# Patient Record
Sex: FEMALE | URBAN - METROPOLITAN AREA
[De-identification: names, ages, dates, MRNs, and addresses within clinical notes are randomized per-mention and may not be internally consistent; named-entity substitution may affect disease eponyms.]

---
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
## 2023-01-13 ENCOUNTER — ATHLETIC TRAINING (OUTPATIENT)
Dept: SPORTS MEDICINE | Facility: OTHER | Age: 18
End: 2023-01-13

## 2023-01-13 DIAGNOSIS — M54.50 CHRONIC BILATERAL LOW BACK PAIN WITHOUT SCIATICA: Primary | ICD-10-CM

## 2023-01-13 DIAGNOSIS — G89.29 CHRONIC BILATERAL LOW BACK PAIN WITHOUT SCIATICA: Primary | ICD-10-CM

## 2023-01-13 NOTE — PROGRESS NOTES
Athletic Training Progress Note    Name: Florinda Ocampo  Age: 21 y o  Assessment/Plan:     Visit Diagnosis: Neck and back tightness    Treatment Plan: Cont to work on pain management, strengthening, and flexibility  [x]  Follow-up PRN  []  Follow-up prior to next practice/game for re-evaluation  []  Daily treatment/rehab  Progress note expected weekly  Subjective: Ath reports to the clinic to work on her neck and low back  She states the treatment from her last appointment has helped  She c/o of "knots" in her low back and neck tightness  Objective:   See treatment log below  Treatment Log:     Date: 1/13 12/11 12/7/22   Playing Status: As tolerated As tolerated  As tolerated         Exercise/Treatment      MHP  10min Completed    Sub-occipital release  5min completed   Self stretches  3x45s 3x45s   Retractions x2 3x10 5s hold 3x10 with 5s hold   Cupping  5min    Joint mobs  - completed   VAC  Full body     Momma had a baby and it's head popped off x2        She will cont to make appointments prn

## 2023-01-17 ENCOUNTER — ATHLETIC TRAINING (OUTPATIENT)
Dept: SPORTS MEDICINE | Facility: OTHER | Age: 18
End: 2023-01-17

## 2023-01-17 DIAGNOSIS — S86.312A STRAIN OF LEFT PERONEAL MUSCLE OR TENDON: ICD-10-CM

## 2023-01-17 DIAGNOSIS — M25.572 ACUTE LEFT ANKLE PAIN: Primary | ICD-10-CM

## 2023-01-17 NOTE — PROGRESS NOTES
Athletic Training Foot/Ankle Evaluation    Name: Farzana Dickson  Age: 21 y o    School District: [employer]  Sport: [employer] and [employer]  Date of Assessment: 1/17/2023    Assessment/Plan:     Visit Diagnosis: No primary diagnosis found  Treatment Plan: Cont with strengthening  []  Follow-up PRN  []  Follow-up prior to next practice/game for re-evaluation  [x]  Daily treatment/rehab  Progress note expected weekly  Referral:     [x]  Not needed at this time  []  Referred to:     [x]  Coaching staff notified  []  Parent/Guardian Notified    Subjective:    Date of Injury: unknown    Injury occurred during:     []  Practice  []  Competition  [x]  Other: unknown    Mechanism: unknown    Previous History: Has past history of tendinitis of B/L ankles    Reported Symptoms:     [] Felt pop [] Weakness   [] Cracking or snapping [] Grinding   [] Twisted [x] Sharp pain   [x] Pain with rest [] Burning   [x] Pain with activity [x] Dull or achy   [x] Pain with stairs [] Felt give way   [] Numbness or tingling [] Loss of motion     Objective:    Observation:     [x]  No observable findings compared bilaterally    [] Swelling [] Callous or blister   [] Ecchymosis [] Nail abnormality   [] Redness [] Ingrown nail   [] Deformity [] Bunion formation   [] Abnormal gait [] Pes planus   [] Pitting edema [] Pes cavus   [] Open wound [] Atrophy     Palpation: TTP along the posterior tibialis tendon and achilles tendon      Active Range of Motion:      Full  ROM Limited  ROM Pain  with  ROM No  Motion   Dorsiflexion [x] [] [] []   Plantarflexion [x] [] [] []   Inversion [x] [] [] []   Eversion [x] [] [] []   Great Toe Flexion [] [] [] []   Great Toe Extension [] [] [] []   Toe Flexion [] [] [] []   Toe Extension [] [] [] []     ERP with PF    Manual Muscle Tests:     Not performed []             5 4+ 4 4- 3 or  Under   Dorsiflexion [x] [] [] [] []   Plantarflexion [x] [] [] [] []   Inversion [x] [] [] [] []   Eversion [x] [] [] [] []   Great Toe Flexion [] [] [] [] []   Great Toe Extension [] [] [] [] []   Toe Flexion [] [] [] [] []   Toe Extension [] [] [] [] []     Special Tests:      (+)  Laxity (+)  Pain (-)  WNL Not  Tested   Bump [] [] [x] []   Squeeze [] [] [] []   Percussion [] [] [] []   Tuning Fork [] [] [] []   Anterior Drawer [] [] [x] []   Posterior Drawer [] [] [] []   Talar Tilt - Inversion [] [] [x] []   Talar Tilt - Eversion [] [] [x] []   Kleiger [] [] [] []   Toe Compression [] [] [] []   Toe Distraction [] [] [] []   MTP Valgus [] [] [] []   MTP Varus [] [] [] []   Intermetatarsal Glide [] [] [] []   Tarsometatarsal Glide [] [] [] []   Tinel's [] [] [] []   Impingement Sign [] [] [] []   Oseguera's (Achilles) [] [] [] []   Mary's Sign (DVT) [] [] [] []   Interdigital Neuroma [] [] [] []   Navicular Drop [] [] [] []     Treatment Log:     Date: 11/29/22   Playing Status: As tolerated       Exercise/Treatment    4-way ankle blue band 3x10   Side steps with yellow band 5x   Foam  3x B/L   SLB on blue pad 2x45s R and 2x30s L                                  She will cont to make appointments  1/17/23  Pt did not continue to complete appointments  She felt that she injured her outside of her ankle during jumping    LB ATC

## 2023-01-19 ENCOUNTER — ATHLETIC TRAINING (OUTPATIENT)
Dept: SPORTS MEDICINE | Facility: OTHER | Age: 18
End: 2023-01-19

## 2023-01-19 DIAGNOSIS — G89.29 CHRONIC BILATERAL LOW BACK PAIN WITHOUT SCIATICA: Primary | ICD-10-CM

## 2023-01-19 DIAGNOSIS — M54.50 CHRONIC BILATERAL LOW BACK PAIN WITHOUT SCIATICA: Primary | ICD-10-CM

## 2023-01-19 NOTE — PROGRESS NOTES
Athletic Training Progress Note    Name: Rafaela Bermudez  Age: 21 y o  Assessment/Plan:   Visit Diagnosis: Mid back pain     Treatment Plan:   []  Follow-up PRN  []  Follow-up prior to next practice/game for re-evaluation  [x]  Daily treatment/rehab  Progress note expected weekly  Subjective: Ath states her mid back is bothering her  She doesn't remember doing anything specific  Objective:   See treatment log below    Treatment Log:  Date: 11/9/22   Playing Status: As tolerated       Exercise/Treatment    MHP 10min   Knee windshield wipers 3x10   Pelvic tilts 2x10 (focus on breathing)         1/19/23  Resolved    LB ATC

## 2023-01-19 NOTE — PROGRESS NOTES
1/19/23  Pt asked if she should compete this weekend and discussed no to compete  Complete Rolling, back unloading, and thoracic mob  LB ATC    1/17/23  A: Left ankle fibularis tendinopathy  S: Pt states that she felt sore in the area

## 2023-01-22 ENCOUNTER — ATHLETIC TRAINING (OUTPATIENT)
Dept: SPORTS MEDICINE | Facility: OTHER | Age: 18
End: 2023-01-22

## 2023-01-22 DIAGNOSIS — S86.312A STRAIN OF LEFT PERONEAL MUSCLE OR TENDON: Primary | ICD-10-CM

## 2023-01-23 NOTE — PROGRESS NOTES
Athletic Training Foot/Ankle Evaluation     Name: Yamileth Cade  Age: 21 y o    School District: [facility]  Sport: [employer] and [employer]  Date of Assessment: 1/17/2023     Assessment/Plan:      Visit Diagnosis: No primary diagnosis found      Treatment Plan: Cont with strengthening      []? Follow-up PRN  []?  Follow-up prior to next practice/game for re-evaluation  [x]? Daily treatment/rehab  Progress note expected weekly       Referral:      [x]? Not needed at this time  []? Referred to:      [x]? Coaching staff notified  []? Parent/Guardian Notified     Subjective:     Date of Injury: unknown     Injury occurred during:      []? Practice  []? Competition  [x]? Other: unknown     Mechanism: unknown     Previous History: Has past history of tendinitis of B/L ankles     Reported Symptoms:      []?  Felt pop []?  Weakness   []?  Cracking or snapping []?  Grinding   []?  Twisted [x]?   Sharp pain   [x]?  Pain with rest []?  Burning   [x]?  Pain with activity [x]?  Dull or achy   [x]?  Pain with stairs []?  [place] give way   []?  Numbness or tingling []?  Loss of motion      Objective:     Observation:      [x]?   No observable findings compared bilaterally     []?  Swelling []?  Callous or blister   []?  Ecchymosis []?  Nail abnormality   []?  Redness []?  Ingrown nail   []?  Deformity []?  Bunion formation   []?  Abnormal gait []?  Pes planus   []?  Pitting edema []?  Pes cavus   []?  Open wound []?  Atrophy      Palpation: TTP along the posterior tibialis tendon and achilles tendon      Active Range of Motion:        Full  ROM Limited  ROM Pain  with  ROM No  Motion   Dorsiflexion [x]?  []?  []?  []?    Plantarflexion [x]?  []?  []?  []?    Inversion [x]?  []?  []?  []?    Eversion [x]?  []?  []?  []?    Great Toe Flexion []?  []?  []?  []?    Great Toe Extension []?  []?  []?  []?    Toe Flexion []?  []?  []?  []?    Toe Extension []?  []?  []?  []?       ERP with PF     Manual Muscle Tests:     Not performed []?                      5 4+ 4 4- 3 or  Under   Dorsiflexion [x]?  []?  []?  []?  []?    Plantarflexion [x]?  []?  []?  []?  []?    Inversion [x]?  []?  []?  []?  []?    Eversion [x]?  []?  []?  []?  []?    Great Toe Flexion []?  []?  []?  []?  []?    Great Toe Extension []?  []?  []?  []?  []?    Toe Flexion []?  []?  []?  []?  []?    Toe Extension []?  []?  []?  []?  []?       Special Tests:        (+)  Laxity (+)  Pain (-)  WNL Not  Tested   Bump []?  []?  [x]?  []?    Squeeze []?  []?  []?  []?    Percussion []?  []?  []?  []?    Tuning Fork []?  []?  []?  []?    Anterior Drawer []?  []?  [x]?  []?    Posterior Drawer []?  []?  []?  []?    Talar Tilt - Inversion []?  []?  [x]?  []?    Talar Tilt - Eversion []?  []?  [x]?  []?    Kleiger []?  []?  []?  []?    Toe Compression []?  []?  []?  []?    Toe Distraction []?  []?  []?  []?    MTP Valgus []?  []?  []?  []?    MTP Varus []?  []?  []?  []?    Intermetatarsal Glide []?  []?  []?  []?    Tarsometatarsal Glide []?  []?  []?  []?    Tinel's []?  []?  []?  []?    Impingement Sign []?  []?  []?  []?    Oseguera's (Achilles) []?  []?  []?  []?    Mary's Sign (DVT) []?  []?  []?  []?    Interdigital Neuroma []?  []?  []?  []?    Navicular Drop []?  []?  []?  []?       Treatment Log:  Date: 1/23/23 11/29/22   Playing Status: As Tolerated As tolerated          Exercise/Treatment      4-way ankle blue band 3x10 3x10   Side steps with yellow band 5x 5x   Foam  3xB/L 3x B/L   SLB on blue pad X 2x45s R and 2x30s L                                                        She will cont to make appointments      1/17/23  Pt did not continue to complete appointments  She felt that she injured her outside of her ankle during jumping    LB ATC

## 2023-01-24 ENCOUNTER — ATHLETIC TRAINING (OUTPATIENT)
Dept: SPORTS MEDICINE | Facility: OTHER | Age: 18
End: 2023-01-24

## 2023-01-24 DIAGNOSIS — S86.312A STRAIN OF LEFT PERONEAL MUSCLE OR TENDON: Primary | ICD-10-CM

## 2023-01-25 NOTE — PROGRESS NOTES
Athletic Training Foot/Ankle Evaluation     Name: Yamileth Cade  Age: 21 y o    School District: [facility]  Sport: [employer]  Date of Assessment: 1/17/2023     Assessment/Plan:      Visit Diagnosis: No primary diagnosis found      Treatment Plan: Cont with strengthening      []? Follow-up PRN  []?  Follow-up prior to next practice/game for re-evaluation  [x]? Daily treatment/rehab  Progress note expected weekly       Referral:      [x]? Not needed at this time  []? Referred to:      [x]? Coaching staff notified  []? Parent/Guardian Notified     Subjective:     Date of Injury: unknown     Injury occurred during:      []? Practice  []? Competition  [x]? Other: unknown     Mechanism: unknown     Previous History: Has past history of tendinitis of B/L ankles     Reported Symptoms:      []?  Felt pop []?  Weakness   []?  Cracking or snapping []?  Grinding   []?  Twisted [x]?   Sharp pain   [x]?  Pain with rest []?  Burning   [x]?  Pain with activity [x]?  Dull or achy   [x]?  Pain with stairs []?  [place] give way   []?  Numbness or tingling []?  Loss of motion      Objective:     Observation:      [x]?   No observable findings compared bilaterally     []?  Swelling []?  Callous or blister   []?  Ecchymosis []?  Nail abnormality   []?  Redness []?  Ingrown nail   []?  Deformity []?  Bunion formation   []?  Abnormal gait []?  Pes planus   []?  Pitting edema []?  Pes cavus   []?  Open wound []?  Atrophy      Palpation: TTP along the posterior tibialis tendon and achilles tendon      Active Range of Motion:        Full  ROM Limited  ROM Pain  with  ROM No  Motion   Dorsiflexion [x]?  []?  []?  []?    Plantarflexion [x]?  []?  []?  []?    Inversion [x]?  []?  []?  []?    Eversion [x]?  []?  []?  []?    Great Toe Flexion []?  []?  []?  []?    Great Toe Extension []?  []?  []?  []?    Toe Flexion []?  []?  []?  []?    Toe Extension []?  []?  []?  []?       ERP with PF     Manual Muscle Tests:     Not performed []?                      5 4+ 4 4- 3 or  Under   Dorsiflexion [x]?  []?  []?  []?  []?    Plantarflexion [x]?  []?  []?  []?  []?    Inversion [x]?  []?  []?  []?  []?    Eversion [x]?  []?  []?  []?  []?    Great Toe Flexion []?  []?  []?  []?  []?    Great Toe Extension []?  []?  []?  []?  []?    Toe Flexion []?  []?  []?  []?  []?    Toe Extension []?  []?  []?  []?  []?       Special Tests:        (+)  Laxity (+)  Pain (-)  WNL Not  Tested   Bump []?  []?  [x]?  []?    Squeeze []?  []?  []?  []?    Percussion []?  []?  []?  []?    Tuning Fork []?  []?  []?  []?    Anterior Drawer []?  []?  [x]?  []?    Posterior Drawer []?  []?  []?  []?    Talar Tilt - Inversion []?  []?  [x]?  []?    Talar Tilt - Eversion []?  []?  [x]?  []?    Kleiger []?  []?  []?  []?    Toe Compression []?  []?  []?  []?    Toe Distraction []?  []?  []?  []?    MTP Valgus []?  []?  []?  []?    MTP Varus []?  []?  []?  []?    Intermetatarsal Glide []?  []?  []?  []?    Tarsometatarsal Glide []?  []?  []?  []?    Tinel's []?  []?  []?  []?    Impingement Sign []?  []?  []?  []?    Oseguera's (Achilles) []?  []?  []?  []?    Mary's Sign (DVT) []?  []?  []?  []?    Interdigital Neuroma []?  []?  []?  []?    Navicular Drop []?  []?  []?  []?       Treatment Log:  Date: 1/24/23 1/23/23 11/29/22   Playing Status: As tolerated As Tolerated As tolerated           Exercise/Treatment       4-way ankle blue band  3x10 3x10   Side steps with yellow band  5x 5x   Foam   3xB/L 3x B/L   SLB on blue pad  X 2x45s R and 2x30s L    Stepup 10       Bounds 2x10       Tables x6                                          She will cont to make appointments    1/24/23  Pt was able to run yesterday and would like to jump today  LB ATC    1/17/23  Pt did not continue to complete appointments  She felt that she injured her outside of her ankle during jumping    LB ATC

## 2023-01-26 ENCOUNTER — ATHLETIC TRAINING (OUTPATIENT)
Dept: SPORTS MEDICINE | Facility: OTHER | Age: 18
End: 2023-01-26

## 2023-01-26 DIAGNOSIS — M25.572 ACUTE LEFT ANKLE PAIN: ICD-10-CM

## 2023-01-26 DIAGNOSIS — S86.312A STRAIN OF LEFT PERONEAL MUSCLE OR TENDON: Primary | ICD-10-CM

## 2023-01-26 NOTE — PROGRESS NOTES
Athletic Training Foot/Ankle Evaluation     Name: Reji Aranda  Age: 21 y o    School District: [facility] [employer] [facility]  Sport: [employer]  Date of Assessment: 1/17/2023     Assessment/Plan:      Visit Diagnosis: No primary diagnosis found      Treatment Plan: Cont with strengthening      []? Follow-up PRN  []?  Follow-up prior to next practice/game for re-evaluation  [x]? Daily treatment/rehab  Progress note expected weekly       Referral:      [x]? Not needed at this time  []? Referred to:      [x]? Coaching staff notified  []? Parent/Guardian Notified     Subjective:     Date of Injury: unknown     Injury occurred during:      []? Practice  []? Competition  [x]? Other: unknown     Mechanism: unknown     Previous History: Has past history of tendinitis of B/L ankles     Reported Symptoms:      []?  Felt pop []?  Weakness   []?  Cracking or snapping []?  Grinding   []?  Twisted [x]?   Sharp pain   [x]?  Pain with rest []?  Burning   [x]?  Pain with activity [x]?  Dull or achy   [x]?  Pain with stairs []?  [place] give way   []?  Numbness or tingling []?  Loss of motion      Objective:     Observation:      [x]?   No observable findings compared bilaterally     []?  Swelling []?  Callous or blister   []?  Ecchymosis []?  Nail abnormality   []?  Redness []?  Ingrown nail   []?  Deformity []?  Bunion formation   []?  Abnormal gait []?  Pes planus   []?  Pitting edema []?  Pes cavus   []?  Open wound []?  Atrophy      Palpation: TTP along the posterior tibialis tendon and achilles tendon      Active Range of Motion:        Full  ROM Limited  ROM Pain  with  ROM No  Motion   Dorsiflexion [x]?  []?  []?  []?    Plantarflexion [x]?  []?  []?  []?    Inversion [x]?  []?  []?  []?    Eversion [x]?  []?  []?  []?    Great Toe Flexion []?  []?  []?  []?    Great Toe Extension []?  []?  []?  []?    Toe Flexion []?  []?  []?  []?    Toe Extension []?  []?  []?  []?       ERP with PF     Manual Muscle Tests:     Not performed []?                      5 4+ 4 4- 3 or  Under   Dorsiflexion [x]?  []?  []?  []?  []?    Plantarflexion [x]?  []?  []?  []?  []?    Inversion [x]?  []?  []?  []?  []?    Eversion [x]?  []?  []?  []?  []?    Great Toe Flexion []?  []?  []?  []?  []?    Great Toe Extension []?  []?  []?  []?  []?    Toe Flexion []?  []?  []?  []?  []?    Toe Extension []?  []?  []?  []?  []?       Special Tests:        (+)  Laxity (+)  Pain (-)  WNL Not  Tested   Bump []?  []?  [x]?  []?    Squeeze []?  []?  []?  []?    Percussion []?  []?  []?  []?    Tuning Fork []?  []?  []?  []?    Anterior Drawer []?  []?  [x]?  []?    Posterior Drawer []?  []?  []?  []?    Talar Tilt - Inversion []?  []?  [x]?  []?    Talar Tilt - Eversion []?  []?  [x]?  []?    Kleiger []?  []?  []?  []?    Toe Compression []?  []?  []?  []?    Toe Distraction []?  []?  []?  []?    MTP Valgus []?  []?  []?  []?    MTP Varus []?  []?  []?  []?    Intermetatarsal Glide []?  []?  []?  []?    Tarsometatarsal Glide []?  []?  []?  []?    Tinel's []?  []?  []?  []?    Impingement Sign []?  []?  []?  []?    Oseguera's (Achilles) []?  []?  []?  []?    Mary's Sign (DVT) []?  []?  []?  []?    Interdigital Neuroma []?  []?  []?  []?    Navicular Drop []?  []?  []?  []?       Treatment Log:  Date: 1/26 1/24/23 1/23/23 11/29/22   Playing Status: As tolerated As tolerated As Tolerated As tolerated            Exercise/Treatment        4-way ankle blue band   3x10 3x10   Side steps with yellow band X  5x 5x   Foam  X  3xB/L 3x B/L   SLB on blue pad X  X 2x45s R and 2x30s L    Stepup  10       Bounds  2x10       Tables  x6       side bends 3x5                                        She will cont to make appointments  1/26  Added sports massage  LB ATC     1/24/23  Pt was able to run yesterday and would like to jump today  LB ATC    1/17/23  Pt did not continue to complete appointments   She felt that she injured her outside of her ankle during jumping    LB ATC

## 2023-01-31 ENCOUNTER — ATHLETIC TRAINING (OUTPATIENT)
Dept: SPORTS MEDICINE | Facility: OTHER | Age: 18
End: 2023-01-31

## 2023-01-31 DIAGNOSIS — M62.89 HAMSTRING TIGHTNESS: Primary | ICD-10-CM

## 2023-01-31 DIAGNOSIS — M62.89 QUADRICEP TIGHTNESS: ICD-10-CM

## 2023-02-01 NOTE — PROGRESS NOTES
Athletic Training Progress Note    Name: Cheli Rivera  Age: 21 y o  Assessment/Plan:     Visit Diagnosis: Hamstring tightness [M62 89]    Treatment Plan:     [x]  Follow-up PRN  []  Follow-up prior to next practice/game for re-evaluation  []  Daily treatment/rehab  Progress note expected weekly  Subjective: Pt states that they are experiencing some hamstring pain when performing some sports specific exercises  Objective:   Palpable tightness on bilateral hamstrings  MMT showed slight deficit in left knee flexion  Pt mentioned many other lower extremity issues she was experiencing       Treatment Log:    Date: 1/31/23   Playing Status: As Tolerated       Exercise/Treatment    IASTM on lower extremity X   Normatec X   Eccentric calf raises 3x8

## 2023-02-03 ENCOUNTER — ATHLETIC TRAINING (OUTPATIENT)
Dept: SPORTS MEDICINE | Facility: OTHER | Age: 18
End: 2023-02-03

## 2023-02-03 DIAGNOSIS — M62.89 QUADRICEP TIGHTNESS: ICD-10-CM

## 2023-02-03 DIAGNOSIS — M62.89 HAMSTRING TIGHTNESS: Primary | ICD-10-CM

## 2023-02-03 NOTE — PROGRESS NOTES
Athletic Training Progress Note    Name: Mendel Honey  Age: 21 y o  Assessment/Plan:     Visit Diagnosis: Hamstring tightness [M62 89]    Treatment Plan:     [x]  Follow-up PRN  []  Follow-up prior to next practice/game for re-evaluation  []  Daily treatment/rehab  Progress note expected weekly  Subjective: Pt states that they are experiencing some hamstring pain when performing some sports specific exercises  Objective:   Palpable tightness on bilateral hamstrings  MMT showed slight deficit in left knee flexion  Pt mentioned many other lower extremity issues she was experiencing       Treatment Log:    Date: 2/3 1/31/23   Playing Status: As tolerated As Tolerated        Exercise/Treatment     IASTM on lower extremity  X   Normatec  X   Eccentric calf raises 3x8 3x8   WILLY 7' L4                                         LB ATC

## 2023-02-06 ENCOUNTER — ATHLETIC TRAINING (OUTPATIENT)
Dept: SPORTS MEDICINE | Facility: OTHER | Age: 18
End: 2023-02-06

## 2023-02-06 DIAGNOSIS — M25.572 ACUTE LEFT ANKLE PAIN: ICD-10-CM

## 2023-02-06 DIAGNOSIS — S86.312A STRAIN OF LEFT PERONEAL MUSCLE OR TENDON: Primary | ICD-10-CM

## 2023-02-06 NOTE — PROGRESS NOTES
Athletic Training Foot/Ankle Evaluation    Name: Mendel Honey  Age: 21 y o    School District: [facility]  Sport: Track and [employer]  Date of Assessment: 2/6/2023    Assessment/Plan:     Visit Diagnosis: No primary diagnosis found  Treatment Plan: Cont with strengthening  []  Follow-up PRN  []  Follow-up prior to next practice/game for re-evaluation  [x]  Daily treatment/rehab  Progress note expected weekly  Referral:     [x]  Not needed at this time  []  Referred to:     [x]  Coaching staff notified  []  Parent/Guardian Notified    Subjective:    Date of Injury: unknown    Injury occurred during:     []  Practice  []  Competition  [x]  Other: unknown    Mechanism: unknown    Previous History: Has past history of tendinitis of B/L ankles    Reported Symptoms:     [] Felt pop [] Weakness   [] Cracking or snapping [] Grinding   [] Twisted [x] Sharp pain   [x] Pain with rest [] Burning   [x] Pain with activity [x] Dull or achy   [x] Pain with stairs [] Felt give way   [] Numbness or tingling [] Loss of motion     Objective:    Observation:     [x]  No observable findings compared bilaterally    [] Swelling [] Callous or blister   [] Ecchymosis [] Nail abnormality   [] Redness [] Ingrown nail   [] Deformity [] Bunion formation   [] Abnormal gait [] Pes planus   [] Pitting edema [] Pes cavus   [] Open wound [] Atrophy     Palpation: TTP along the posterior tibialis tendon and achilles tendon      Active Range of Motion:      Full  ROM Limited  ROM Pain  with  ROM No  Motion   Dorsiflexion [x] [] [] []   Plantarflexion [x] [] [] []   Inversion [x] [] [] []   Eversion [x] [] [] []   Great Toe Flexion [] [] [] []   Great Toe Extension [] [] [] []   Toe Flexion [] [] [] []   Toe Extension [] [] [] []     ERP with PF    Manual Muscle Tests:     Not performed []             5 4+ 4 4- 3 or  Under   Dorsiflexion [x] [] [] [] []   Plantarflexion [x] [] [] [] []   Inversion [x] [] [] [] []   Eversion [x] [] [] [] []   Great Toe Flexion [] [] [] [] []   Great Toe Extension [] [] [] [] []   Toe Flexion [] [] [] [] []   Toe Extension [] [] [] [] []     Special Tests:      (+)  Laxity (+)  Pain (-)  WNL Not  Tested   Bump [] [] [x] []   Squeeze [] [] [] []   Percussion [] [] [] []   Tuning Fork [] [] [] []   Anterior Drawer [] [] [x] []   Posterior Drawer [] [] [] []   Talar Tilt - Inversion [] [] [x] []   Talar Tilt - Eversion [] [] [x] []   Kleiger [] [] [] []   Toe Compression [] [] [] []   Toe Distraction [] [] [] []   MTP Valgus [] [] [] []   MTP Varus [] [] [] []   Intermetatarsal Glide [] [] [] []   Tarsometatarsal Glide [] [] [] []   Tinel's [] [] [] []   Impingement Sign [] [] [] []   Oseguera's (Achilles) [] [] [] []   Mary's Sign (DVT) [] [] [] []   Interdigital Neuroma [] [] [] []   Navicular Drop [] [] [] []     Treatment Log:     Date: 11/29/22   Playing Status: As tolerated       Exercise/Treatment    4-way ankle blue band 3x10   Side steps with yellow band 5x   Foam  3x B/L   SLB on blue pad 2x45s R and 2x30s L                                  She will cont to make appointments  2/6/23  Pt states that her Ankle is not her main compliant  Discharged    LB ATC

## 2023-02-08 ENCOUNTER — ATHLETIC TRAINING (OUTPATIENT)
Dept: SPORTS MEDICINE | Facility: OTHER | Age: 18
End: 2023-02-08

## 2023-02-08 DIAGNOSIS — M62.89 QUADRICEP TIGHTNESS: ICD-10-CM

## 2023-02-08 DIAGNOSIS — M62.89 HAMSTRING TIGHTNESS: Primary | ICD-10-CM

## 2023-02-08 NOTE — PROGRESS NOTES
Athletic Training Foot/Ankle Evaluation    Name: Ann Frances  Age: 21 y o    School District: [facility] [facility]  Sport: [employer] and [employer]  Date of Assessment: 2/8/2023    Assessment/Plan:     Visit Diagnosis: Hamstring tightness [M62 89]    Treatment Plan: Cont with strengthening  []  Follow-up PRN  []  Follow-up prior to next practice/game for re-evaluation  [x]  Daily treatment/rehab  Progress note expected weekly  Referral:     [x]  Not needed at this time  []  Referred to:     [x]  Coaching staff notified  []  Parent/Guardian Notified    Subjective:    Date of Injury: unknown    Injury occurred during:     []  Practice  []  Competition  [x]  Other: unknown    Mechanism: unknown    Previous History: Has past history of tendinitis of B/L ankles    Reported Symptoms:     [] Felt pop [] Weakness   [] Cracking or snapping [] Grinding   [] Twisted [x] Sharp pain   [x] Pain with rest [] Burning   [x] Pain with activity [x] Dull or achy   [x] Pain with stairs [] Felt give way   [] Numbness or tingling [] Loss of motion     Objective:    Observation:     [x]  No observable findings compared bilaterally    [] Swelling [] Callous or blister   [] Ecchymosis [] Nail abnormality   [] Redness [] Ingrown nail   [] Deformity [] Bunion formation   [] Abnormal gait [] Pes planus   [] Pitting edema [] Pes cavus   [] Open wound [] Atrophy     Palpation: TTP along the posterior tibialis tendon and achilles tendon      Active Range of Motion:      Full  ROM Limited  ROM Pain  with  ROM No  Motion   Dorsiflexion [x] [] [] []   Plantarflexion [x] [] [] []   Inversion [x] [] [] []   Eversion [x] [] [] []   Great Toe Flexion [] [] [] []   Great Toe Extension [] [] [] []   Toe Flexion [] [] [] []   Toe Extension [] [] [] []     ERP with PF    Manual Muscle Tests:     Not performed []             5 4+ 4 4- 3 or  Under   Dorsiflexion [x] [] [] [] []   Plantarflexion [x] [] [] [] []   Inversion [x] [] [] [] []   Eversion [x] [] [] [] []   Great Toe Flexion [] [] [] [] []   Great Toe Extension [] [] [] [] []   Toe Flexion [] [] [] [] []   Toe Extension [] [] [] [] []     Special Tests:      (+)  Laxity (+)  Pain (-)  WNL Not  Tested   Bump [] [] [x] []   Squeeze [] [] [] []   Percussion [] [] [] []   Tuning Fork [] [] [] []   Anterior Drawer [] [] [x] []   Posterior Drawer [] [] [] []   Talar Tilt - Inversion [] [] [x] []   Talar Tilt - Eversion [] [] [x] []   Kleiger [] [] [] []   Toe Compression [] [] [] []   Toe Distraction [] [] [] []   MTP Valgus [] [] [] []   MTP Varus [] [] [] []   Intermetatarsal Glide [] [] [] []   Tarsometatarsal Glide [] [] [] []   Tinel's [] [] [] []   Impingement Sign [] [] [] []   Oseguera's (Achilles) [] [] [] []   Mary's Sign (DVT) [] [] [] []   Interdigital Neuroma [] [] [] []   Navicular Drop [] [] [] []     Treatment Log:     Date: 11/29/22   Playing Status: As tolerated       Exercise/Treatment    4-way ankle blue band 3x10   Side steps with yellow band 5x   Foam  3x B/L   SLB on blue pad 2x45s R and 2x30s L                                  She will cont to make appointments  2/8  Completed WILLY for L4  LB ATC    2/6/23  Pt states that her Ankle is not her main compliant  Discharged    LB ATC

## 2023-02-10 ENCOUNTER — ATHLETIC TRAINING (OUTPATIENT)
Dept: SPORTS MEDICINE | Facility: OTHER | Age: 18
End: 2023-02-10

## 2023-02-10 DIAGNOSIS — M54.50 CHRONIC BILATERAL LOW BACK PAIN WITHOUT SCIATICA: Primary | ICD-10-CM

## 2023-02-10 DIAGNOSIS — G89.29 CHRONIC BILATERAL LOW BACK PAIN WITHOUT SCIATICA: Primary | ICD-10-CM

## 2023-02-10 NOTE — PROGRESS NOTES
Athletic Training Progress Note    Name: Bartolome Khan  Age: 21 y o  Assessment/Plan:   Visit Diagnosis: Mid back pain     Treatment Plan:   []  Follow-up PRN  []  Follow-up prior to next practice/game for re-evaluation  [x]  Daily treatment/rehab  Progress note expected weekly  Subjective: Ath states her mid back is bothering her  She doesn't remember doing anything specific  Objective:   See treatment log below    Treatment Log:  Date: 2/10/23 11/9/22   Playing Status: As tolerated As tolerated        Exercise/Treatment     MHP  10min   Knee windshield wipers  3x10   Pelvic tilts  2x10 (focus on breathing)   Cupping 5'    [place] 3x10                2/10  Pt states that her back is sore  She wanted to loosen her back and wanted to cup  LB ATC    1/19/23  Resolved    LB ATC

## 2023-02-13 ENCOUNTER — ATHLETIC TRAINING (OUTPATIENT)
Dept: SPORTS MEDICINE | Facility: OTHER | Age: 18
End: 2023-02-13

## 2023-02-13 DIAGNOSIS — S86.312A STRAIN OF LEFT PERONEAL MUSCLE OR TENDON: ICD-10-CM

## 2023-02-13 DIAGNOSIS — M54.50 CHRONIC BILATERAL LOW BACK PAIN WITHOUT SCIATICA: Primary | ICD-10-CM

## 2023-02-13 DIAGNOSIS — G89.29 CHRONIC BILATERAL LOW BACK PAIN WITHOUT SCIATICA: Primary | ICD-10-CM

## 2023-02-14 NOTE — PROGRESS NOTES
Athletic Training Progress Note     Name: Tavo Patiño  Age: 21 y o       Assessment/Plan:   Visit Diagnosis: Mid back pain      Treatment Plan:   []? Follow-up PRN  []?  Follow-up prior to next practice/game for re-evaluation  [x]? Daily treatment/rehab  Progress note expected weekly       Subjective: Ath states her mid back is bothering her  She doesn't remember doing anything specific       Objective:   See treatment log below     Treatment Log:  Date: 2/10/23 11/9/22   Playing Status: As tolerated As tolerated           Exercise/Treatment       MHP   10min   Knee windshield wipers   3x10   Pelvic tilts   2x10 (focus on breathing)   Cupping 5'     [place] 3x10                       2/13:  PT states that she is experiencing left hip pain again after her competition this weekend  She completed electrical stimulation for her low back discomfort and cupping for chronic back pain  She also asked for US for her tendinopathy of left ankle  CM    2/10  Pt states that her back is sore  She wanted to loosen her back and wanted to cup  LB ATC     1/19/23  Resolved    LB ATC

## 2023-02-15 ENCOUNTER — ATHLETIC TRAINING (OUTPATIENT)
Dept: SPORTS MEDICINE | Facility: OTHER | Age: 18
End: 2023-02-15

## 2023-02-15 DIAGNOSIS — M54.50 CHRONIC BILATERAL LOW BACK PAIN WITHOUT SCIATICA: Primary | ICD-10-CM

## 2023-02-15 DIAGNOSIS — G89.29 CHRONIC BILATERAL LOW BACK PAIN WITHOUT SCIATICA: Primary | ICD-10-CM

## 2023-02-16 NOTE — PROGRESS NOTES
Athletic Training Progress Note     Name: Reji Aranda  Age: 21 y o       Assessment/Plan:   Visit Diagnosis: Mid back pain      Treatment Plan:   []? Follow-up PRN  []?  Follow-up prior to next practice/game for re-evaluation  [x]? Daily treatment/rehab  Progress note expected weekly       Subjective: Ath states her mid back is bothering her  She doesn't remember doing anything specific       Objective:   See treatment log below     Treatment Log:  Date: 2/10/23 11/9/22   Playing Status: As tolerated As tolerated           Exercise/Treatment       MHP   10min   Knee windshield wipers   3x10   Pelvic tilts   2x10 (focus on breathing)   Cupping 5'     [place] 3x10                       2/15  Pt states that her challenges or more than physical  Focused on unloading tissues and pain relieve  LB ATC    2/13:  PT states that she is experiencing left hip pain again after her competition this weekend  She completed electrical stimulation for her low back discomfort and cupping for chronic back pain  She also asked for US for her tendinopathy of left ankle  CM    2/10  Pt states that her back is sore  She wanted to loosen her back and wanted to cup  LB ATC     1/19/23  Resolved    LB ATC

## 2023-02-17 ENCOUNTER — ATHLETIC TRAINING (OUTPATIENT)
Dept: SPORTS MEDICINE | Facility: OTHER | Age: 18
End: 2023-02-17

## 2023-02-17 DIAGNOSIS — G89.29 CHRONIC BILATERAL LOW BACK PAIN WITHOUT SCIATICA: Primary | ICD-10-CM

## 2023-02-17 DIAGNOSIS — M54.50 CHRONIC BILATERAL LOW BACK PAIN WITHOUT SCIATICA: Primary | ICD-10-CM

## 2023-02-17 NOTE — PROGRESS NOTES
Athletic Training Progress Note     Name: Osvaldo Riding  Age: 21 y o       Assessment/Plan:   Visit Diagnosis: Mid back pain      Treatment Plan:   []? Follow-up PRN  []?  Follow-up prior to next practice/game for re-evaluation  [x]? Daily treatment/rehab  Progress note expected weekly       Subjective: Ath states her mid back is bothering her  She doesn't remember doing anything specific       Objective:   See treatment log below     Treatment Log:  Date: 2/10/23 11/9/22   Playing Status: As tolerated As tolerated           Exercise/Treatment       MHP   10min   Knee windshield wipers   3x10   Pelvic tilts   2x10 (focus on breathing)   Cupping 5'     [place] 3x10                       2/17  Pt states that her back is tight extending into her neck  She believes that the changes related to her mental wellness are adding to his discomfort  LB ATC    2/15  Pt states that her challenges or more than physical  Focused on unloading tissues and pain relieve  LB ATC    2/13:  PT states that she is experiencing left hip pain again after her competition this weekend  She completed electrical stimulation for her low back discomfort and cupping for chronic back pain  She also asked for US for her tendinopathy of left ankle  CM    2/10  Pt states that her back is sore  She wanted to loosen her back and wanted to cup  LB ATC     1/19/23  Resolved    LB ATC

## 2023-02-20 ENCOUNTER — ATHLETIC TRAINING (OUTPATIENT)
Dept: SPORTS MEDICINE | Facility: OTHER | Age: 18
End: 2023-02-20

## 2023-02-20 DIAGNOSIS — S86.312A STRAIN OF LEFT PERONEAL MUSCLE OR TENDON: ICD-10-CM

## 2023-02-20 DIAGNOSIS — G89.29 CHRONIC BILATERAL LOW BACK PAIN WITHOUT SCIATICA: Primary | ICD-10-CM

## 2023-02-20 DIAGNOSIS — M54.50 CHRONIC BILATERAL LOW BACK PAIN WITHOUT SCIATICA: Primary | ICD-10-CM

## 2023-02-21 NOTE — PROGRESS NOTES
Athletic Training Progress Note     Shamika Pérez  Age: 17 y o       Assessment/Plan:   Visit Diagnosis: Mid back pain      Treatment Plan:   []??  Follow-up PRN    []??  Follow-up prior to next practice/game for re-evaluation  [x]? ?  Daily treatment/rehab  Progress note expected weekly       Subjective: Ath states her mid back is bothering her  She doesn't remember doing anything specific       Objective:   See treatment log below     Treatment Log:  Date: 2/10/23 11/9/22   Playing Status: As tolerated As tolerated           Exercise/Treatment       MHP   10min   Knee windshield wipers   3x10   Pelvic tilts   2x10 (focus on breathing)   Cupping 5'     [place] 3x10                        2/20  Pt states that they still have discomfort in their back and does not understand why she experiences the discomfort so often  She also said she experienced "'intense cramping pain" while practicing taping in class  Pt completed TMR and electrical stimulation for her back pain and IASTM for lower leg pain  CM    2/17  Pt states that her back is tight extending into her neck  She believes that the changes related to her mental wellness are adding to his discomfort  LB ATC     2/15  Pt states that her challenges or more than physical  Focused on unloading tissues and pain relieve  LB ATC     2/13:  PT states that she is experiencing left hip pain again after her competition this weekend  She completed electrical stimulation for her low back discomfort and cupping for chronic back pain  She also asked for US for her tendinopathy of left ankle  CM     2/10  Pt states that her back is sore  She wanted to loosen her back and wanted to cup  LB ATC     1/19/23  Resolved    LB ATC

## 2023-02-22 ENCOUNTER — ATHLETIC TRAINING (OUTPATIENT)
Dept: SPORTS MEDICINE | Facility: OTHER | Age: 18
End: 2023-02-22

## 2023-02-22 DIAGNOSIS — M25.572 ACUTE LEFT ANKLE PAIN: Primary | ICD-10-CM

## 2023-02-22 NOTE — PROGRESS NOTES
Athletic Training Progress Note     Marleni Jesus  Age: 17 y o       Assessment/Plan:   Visit Diagnosis: Mid back pain      Treatment Plan:   []??  Follow-up PRN    []??  Follow-up prior to next practice/game for re-evaluation  [x]? ?  Daily treatment/rehab  Progress note expected weekly       Subjective: Ath states her mid back is bothering her  She doesn't remember doing anything specific       Objective:   See treatment log below     Treatment Log:  Date: 2/10/23 11/9/22   Playing Status: As tolerated As tolerated           Exercise/Treatment       MHP   10min   Knee windshield wipers   3x10   Pelvic tilts   2x10 (focus on breathing)   Cupping 5'     [place] 3x10                       2/22  Pt states that her Left ankle and foot were the main issue today  Completed STM and TMR  LB ATC     2/20  Pt states that they still have discomfort in their back and does not understand why she experiences the discomfort so often  She also said she experienced "'intense cramping pain" while practicing taping in class  Pt completed TMR and electrical stimulation for her back pain and IASTM for lower leg pain  CM    2/17  Pt states that her back is tight extending into her neck  She believes that the changes related to her mental wellness are adding to his discomfort  LB ATC     2/15  Pt states that her challenges or more than physical  Focused on unloading tissues and pain relieve  LB ATC     2/13:  PT states that she is experiencing left hip pain again after her competition this weekend  She completed electrical stimulation for her low back discomfort and cupping for chronic back pain  She also asked for US for her tendinopathy of left ankle  CM     2/10  Pt states that her back is sore  She wanted to loosen her back and wanted to cup  LB ATC     1/19/23  Resolved    LB ATC

## 2023-02-28 ENCOUNTER — ATHLETIC TRAINING (OUTPATIENT)
Dept: SPORTS MEDICINE | Facility: OTHER | Age: 18
End: 2023-02-28

## 2023-02-28 DIAGNOSIS — G89.29 CHRONIC BILATERAL LOW BACK PAIN WITHOUT SCIATICA: Primary | ICD-10-CM

## 2023-02-28 DIAGNOSIS — M54.50 CHRONIC BILATERAL LOW BACK PAIN WITHOUT SCIATICA: Primary | ICD-10-CM

## 2023-02-28 NOTE — PROGRESS NOTES
Athletic Training Progress Note     Fer   Age: 17 y o       Assessment/Plan:   Visit Diagnosis: Mid back pain      Treatment Plan:   []??  Follow-up PRN    []??  Follow-up prior to next practice/game for re-evaluation  [x]? ?  Daily treatment/rehab  Progress note expected weekly       Subjective: Ath states her mid back is bothering her  She doesn't remember doing anything specific       Objective:   See treatment log below     Treatment Log:  Date: 2/10/23 11/9/22   Playing Status: As tolerated As tolerated           Exercise/Treatment       MHP   10min   Knee windshield wipers   3x10   Pelvic tilts   2x10 (focus on breathing)   Cupping 5'     [place] 3x10                       2/28  Pt was able to jump fully and has not been seen for back pain in the past week  Discharged  LB ATC     2/20  Pt states that they still have discomfort in their back and does not understand why she experiences the discomfort so often  She also said she experienced "'intense cramping pain" while practicing taping in class  Pt completed TMR and electrical stimulation for her back pain and IASTM for lower leg pain  CM    2/17  Pt states that her back is tight extending into her neck  She believes that the changes related to her mental wellness are adding to his discomfort  LB ATC     2/15  Pt states that her challenges or more than physical  Focused on unloading tissues and pain relieve  LB ATC     2/13:  PT states that she is experiencing left hip pain again after her competition this weekend  She completed electrical stimulation for her low back discomfort and cupping for chronic back pain  She also asked for US for her tendinopathy of left ankle  CM     2/10  Pt states that her back is sore  She wanted to loosen her back and wanted to cup  LB ATC     1/19/23  Resolved    LB ATC

## 2023-03-02 ENCOUNTER — ATHLETIC TRAINING (OUTPATIENT)
Dept: SPORTS MEDICINE | Facility: OTHER | Age: 18
End: 2023-03-02

## 2023-03-02 DIAGNOSIS — M79.672 ARCH PAIN OF LEFT FOOT: Primary | ICD-10-CM

## 2023-03-02 NOTE — PROGRESS NOTES
3/2  A: Left foot strain  S: Pt states that her foot is tight and has been bothering her for two days  O: Has been taping arch  P: Added a toe taping and completed joint mobilizations for full body    LB ATC

## 2023-03-13 ENCOUNTER — ATHLETIC TRAINING (OUTPATIENT)
Dept: SPORTS MEDICINE | Facility: OTHER | Age: 18
End: 2023-03-13

## 2023-03-13 DIAGNOSIS — M54.50 CHRONIC BILATERAL LOW BACK PAIN WITHOUT SCIATICA: Primary | ICD-10-CM

## 2023-03-13 DIAGNOSIS — S86.312A STRAIN OF LEFT PERONEAL MUSCLE OR TENDON: Primary | ICD-10-CM

## 2023-03-13 DIAGNOSIS — G89.29 CHRONIC BILATERAL LOW BACK PAIN WITHOUT SCIATICA: Primary | ICD-10-CM

## 2023-03-13 DIAGNOSIS — S76.302A LEFT HAMSTRING INJURY, INITIAL ENCOUNTER: Primary | ICD-10-CM

## 2023-03-13 NOTE — PROGRESS NOTES
3/13/23  A: Right Ankle strain  S: Pt states that she feels in inversion with her ankle during jessi drill this AM  She was able to continue but limited during lift due to pain  O: AROM inv worst and MTT DF and eversion worst  3D foot imaging available  P: Will attempt to tape for jumping and warmup to determine if it would be beneficial for her to jump tomorrow    LB ATC

## 2023-03-13 NOTE — PROGRESS NOTES
Athletic Training Foot/Ankle Evaluation    Name: Yamileth Cade  Age: 21 y o    School District: [address] [place] [address] Road  Sport: Track and [employer]  Date of Assessment: 3/13/2023    Assessment/Plan:     Visit Diagnosis: Left hamstring injury, initial encounter [[id number]]    Treatment Plan: Cont with strengthening  []  Follow-up PRN  []  Follow-up prior to next practice/game for re-evaluation  [x]  Daily treatment/rehab  Progress note expected weekly  Referral:     [x]  Not needed at this time  []  Referred to:     [x]  Coaching staff notified  []  Parent/Guardian Notified    Subjective:    Date of Injury: unknown    Injury occurred during:     []  Practice  []  Competition  [x]  Other: unknown    Mechanism: unknown    Previous History: Has past history of tendinitis of B/L ankles    Reported Symptoms:     [] Felt pop [] Weakness   [] Cracking or snapping [] Grinding   [] Twisted [x] Sharp pain   [x] Pain with rest [] Burning   [x] Pain with activity [x] Dull or achy   [x] Pain with stairs [] Felt give way   [] Numbness or tingling [] Loss of motion     Objective:    Observation:     [x]  No observable findings compared bilaterally    [] Swelling [] Callous or blister   [] Ecchymosis [] Nail abnormality   [] Redness [] Ingrown nail   [] Deformity [] Bunion formation   [] Abnormal gait [] Pes planus   [] Pitting edema [] Pes cavus   [] Open wound [] Atrophy     Palpation: TTP along the posterior tibialis tendon and achilles tendon      Active Range of Motion:      Full  ROM Limited  ROM Pain  with  ROM No  Motion   Dorsiflexion [x] [] [] []   Plantarflexion [x] [] [] []   Inversion [x] [] [] []   Eversion [x] [] [] []   Great Toe Flexion [] [] [] []   Great Toe Extension [] [] [] []   Toe Flexion [] [] [] []   Toe Extension [] [] [] []     ERP with PF    Manual Muscle Tests:     Not performed []             5 4+ 4 4- 3 or  Under   Dorsiflexion [x] [] [] [] []   Plantarflexion [x] [] [] [] []   Inversion [x] [] [] [] []   Eversion [x] [] [] [] []   Great Toe Flexion [] [] [] [] []   Great Toe Extension [] [] [] [] []   Toe Flexion [] [] [] [] []   Toe Extension [] [] [] [] []     Special Tests:      (+)  Laxity (+)  Pain (-)  WNL Not  Tested   Bump [] [] [x] []   Squeeze [] [] [] []   Percussion [] [] [] []   Tuning Fork [] [] [] []   Anterior Drawer [] [] [x] []   Posterior Drawer [] [] [] []   Talar Tilt - Inversion [] [] [x] []   Talar Tilt - Eversion [] [] [x] []   Kleiger [] [] [] []   Toe Compression [] [] [] []   Toe Distraction [] [] [] []   MTP Valgus [] [] [] []   MTP Varus [] [] [] []   Intermetatarsal Glide [] [] [] []   Tarsometatarsal Glide [] [] [] []   Tinel's [] [] [] []   Impingement Sign [] [] [] []   Oseguera's ([place]ch[place]) [] [] [] []   Mary's Sign (DVT) [] [] [] []   Interdigital Neuroma [] [] [] []   Navicular Drop [] [] [] []     Treatment Log:     Date: 11/29/22   Playing Status: As tolerated       Exercise/Treatment    4-way ankle blue band 3x10   Side steps with yellow band 5x   Foam  3x B/L   SLB on blue pad 2x45s R and 2x30s L                                   3/13/23  Discharged  LB ATC     She will cont to make appointments  2/8  Completed WILLY for L4  LB ATC    2/6/23  Pt states that her Ankle is not her main compliant  Discharged    LB ATC

## 2023-03-14 NOTE — PROGRESS NOTES
3/13/23:  Pt completed ice cup on her foot/lower leg  She will attempt different tapings tomorrow for practice  CM    3/13/23  A: Right Ankle strain  S: Pt states that she feels in inversion with her ankle during jessi drill this AM  She was able to continue but limited during lift due to pain  O: AROM inv worst and MTT DF and eversion worst  3D foot imaging available  P: Will attempt to tape for jumping and warmup to determine if it would be beneficial for her to jump tomorrow    LB ATC

## 2023-03-14 NOTE — PROGRESS NOTES
Athletic Training Progress Note     Nahun Osage  Age: 17 y o       Assessment/Plan:   Visit Diagnosis: Mid back pain      Treatment Plan:   []???  Follow-up PRN    []???  Follow-up prior to next practice/game for re-evaluation  [x]? ??  Daily treatment/rehab  Progress note expected weekly       Subjective: Ath states her mid back is bothering her  She doesn't remember doing anything specific       Objective:   See treatment log below     Treatment Log:  Date: 2/10/23 11/9/22   Playing Status: As tolerated As tolerated           Exercise/Treatment       MHP   10min   Knee windshield wipers   3x10   Pelvic tilts   2x10 (focus on breathing)   Cupping 5'     [place] 3x10                        3/14:  Pt states they have back pain from playing soccer yesterday  Pt was treated with electrical stimulation and heat  CM    2/28  Pt was able to jump fully and has not been seen for back pain in the past week  Discharged  LB ATC     2/20  Pt states that they still have discomfort in their back and does not understand why she experiences the discomfort so often  She also said she experienced "'intense cramping pain" while practicing taping in class  Pt completed TMR and electrical stimulation for her back pain and IASTM for lower leg pain  CM     2/17  Pt states that her back is tight extending into her neck  She believes that the changes related to her mental wellness are adding to his discomfort  LB ATC     2/15  Pt states that her challenges or more than physical  Focused on unloading tissues and pain relieve  LB ATC     2/13:  PT states that she is experiencing left hip pain again after her competition this weekend  She completed electrical stimulation for her low back discomfort and cupping for chronic back pain  She also asked for US for her tendinopathy of left ankle  CM     2/10  Pt states that her back is sore  She wanted to loosen her back and wanted to cup  LB ATC     1/19/23  Resolved    LB ATC

## 2023-03-15 ENCOUNTER — ATHLETIC TRAINING (OUTPATIENT)
Dept: SPORTS MEDICINE | Facility: OTHER | Age: 18
End: 2023-03-15

## 2023-03-15 DIAGNOSIS — M25.572 ACUTE LEFT ANKLE PAIN: Primary | ICD-10-CM

## 2023-03-15 NOTE — PROGRESS NOTES
3/15/23  Pt completed step downs and full body release  LB ATC    3/13/23:  Pt completed ice cup on her foot/lower leg  She will attempt different tapings tomorrow for practice  CM    3/13/23  A: Right Ankle strain  S: Pt states that she feels in inversion with her ankle during jessi drill this AM  She was able to continue but limited during lift due to pain  O: AROM inv worst and MTT DF and eversion worst  3D foot imaging available  P: Will attempt to tape for jumping and warmup to determine if it would be beneficial for her to jump tomorrow    LB ATC

## 2023-03-17 ENCOUNTER — ATHLETIC TRAINING (OUTPATIENT)
Dept: SPORTS MEDICINE | Facility: OTHER | Age: 18
End: 2023-03-17

## 2023-03-17 DIAGNOSIS — S86.312A STRAIN OF LEFT PERONEAL MUSCLE OR TENDON: Primary | ICD-10-CM

## 2023-03-17 NOTE — PROGRESS NOTES
3/17/23  Pt completed IASTM and VAC  LB ATC    3/15/23  Pt completed step downs and full body release  LB ATC    3/13/23:  Pt completed ice cup on her foot/lower leg  She will attempt different tapings tomorrow for practice  CM    3/13/23  A: Right Ankle strain  S: Pt states that she feels in inversion with her ankle during jessi drill this AM  She was able to continue but limited during lift due to pain  O: AROM inv worst and MTT DF and eversion worst  3D foot imaging available  P: Will attempt to tape for jumping and warmup to determine if it would be beneficial for her to jump tomorrow    LB ATC

## 2023-03-20 ENCOUNTER — ATHLETIC TRAINING (OUTPATIENT)
Dept: SPORTS MEDICINE | Facility: OTHER | Age: 18
End: 2023-03-20

## 2023-03-20 DIAGNOSIS — M79.672 ARCH PAIN OF LEFT FOOT: Primary | ICD-10-CM

## 2023-03-20 DIAGNOSIS — M25.561 ACUTE PAIN OF BOTH KNEES: Primary | ICD-10-CM

## 2023-03-20 DIAGNOSIS — G89.29 CHRONIC BILATERAL LOW BACK PAIN WITHOUT SCIATICA: Primary | ICD-10-CM

## 2023-03-20 DIAGNOSIS — M54.50 CHRONIC BILATERAL LOW BACK PAIN WITHOUT SCIATICA: Primary | ICD-10-CM

## 2023-03-20 DIAGNOSIS — M25.562 ACUTE PAIN OF BOTH KNEES: Primary | ICD-10-CM

## 2023-03-20 NOTE — PROGRESS NOTES
3/20  A: Knee tendinolgia  S: Pt states that her knees are sore from jumping  O: TTP on patella tendons  P: Completed unloading exercises    LB ATC

## 2023-03-20 NOTE — PROGRESS NOTES
Athletic Training Progress Note     Tung Talbot  Age: 17 y o       Assessment/Plan:   Visit Diagnosis: Mid back pain      Treatment Plan:   []???  Follow-up PRN    []???  Follow-up prior to next practice/game for re-evaluation  [x]? ??  Daily treatment/rehab  Progress note expected weekly       Subjective: Ath states her mid back is bothering her  She doesn't remember doing anything specific       Objective:   See treatment log below     Treatment Log:  Date: 2/10/23 11/9/22   Playing Status: As tolerated As tolerated           Exercise/Treatment       MHP   10min   Knee windshield wipers   3x10   Pelvic tilts   2x10 (focus on breathing)   Cupping 5'     [place] 3x10                      3/20  Discharging leg pain episode and moving to a prevention episode  LB ATC    3/14:  Pt states they have back pain from playing soccer yesterday  Pt was treated with electrical stimulation and heat  CM    2/28  Pt was able to jump fully and has not been seen for back pain in the past week  Discharged  LB ATC     2/20  Pt states that they still have discomfort in their back and does not understand why she experiences the discomfort so often  She also said she experienced "'intense cramping pain" while practicing taping in class  Pt completed TMR and electrical stimulation for her back pain and IASTM for lower leg pain  CM     2/17  Pt states that her back is tight extending into her neck  She believes that the changes related to her mental wellness are adding to his discomfort  LB ATC     2/15  Pt states that her challenges or more than physical  Focused on unloading tissues and pain relieve  LB ATC     2/13:  PT states that she is experiencing left hip pain again after her competition this weekend  She completed electrical stimulation for her low back discomfort and cupping for chronic back pain  She also asked for US for her tendinopathy of left ankle  CM     2/10  Pt states that her back is sore   She wanted to loosen her back and wanted to cup  LB ATC     1/19/23  Resolved    LB ATC

## 2023-03-20 NOTE — PROGRESS NOTES
3/20  Tested the taping style and complete IASTM with grabbing the ground  LB ATC    3/2  A: Left foot strain  S: Pt states that her foot is tight and has been bothering her for two days  O: Has been taping arch  P: Added a toe taping and completed joint mobilizations for full body    LB ATC

## 2023-03-21 ENCOUNTER — ATHLETIC TRAINING (OUTPATIENT)
Dept: SPORTS MEDICINE | Facility: OTHER | Age: 18
End: 2023-03-21

## 2023-03-21 DIAGNOSIS — M25.572 ACUTE LEFT ANKLE PAIN: Primary | ICD-10-CM

## 2023-03-21 NOTE — PROGRESS NOTES
3/15/23  Pt completed step downs and full body release  LB ATC    3/13/23:  Pt completed ice cup on her foot/lower leg  She will attempt different tapings tomorrow for practice  CM    3/13/23  A: Right Ankle strain  S: Pt states that she feels in inversion with her ankle during jessi drill this AM  She was able to continue but limited during lift due to pain  O: AROM inv worst and MTT DF and eversion worst  3D foot imaging available  P: Will attempt to tape for jumping and warmup to determine if it would be beneficial for her to jump tomorrow    LB ATC    3/21/23  Left ankle sprain  Foam houses 8x ea  3 way Band Red 3x12  Slant Board Stretches  Calf raises 3x8

## 2023-03-22 ENCOUNTER — ATHLETIC TRAINING (OUTPATIENT)
Dept: SPORTS MEDICINE | Facility: OTHER | Age: 18
End: 2023-03-22

## 2023-03-22 DIAGNOSIS — M25.562 ACUTE PAIN OF BOTH KNEES: Primary | ICD-10-CM

## 2023-03-22 DIAGNOSIS — M25.561 ACUTE PAIN OF BOTH KNEES: Primary | ICD-10-CM

## 2023-03-22 NOTE — PROGRESS NOTES
3/22  Pt states that her legs are sore  Completed walking exercises then IASTM  LB ATC    3/15/23  Pt completed step downs and full body release  LB ATC    3/13/23:  Pt completed ice cup on her foot/lower leg  She will attempt different tapings tomorrow for practice  CM    3/13/23  A: Right Ankle strain  S: Pt states that she feels in inversion with her ankle during jessi drill this AM  She was able to continue but limited during lift due to pain  O: AROM inv worst and MTT DF and eversion worst  3D foot imaging available  P: Will attempt to tape for jumping and warmup to determine if it would be beneficial for her to jump tomorrow    LB ATC    3/21/23  Left ankle sprain  Foam houses 8x ea  3 way Band Red 3x12  Slant Board Stretches  Calf raises 3x8

## 2023-03-24 ENCOUNTER — ATHLETIC TRAINING (OUTPATIENT)
Dept: SPORTS MEDICINE | Facility: OTHER | Age: 18
End: 2023-03-24

## 2023-03-24 DIAGNOSIS — M25.562 ACUTE PAIN OF BOTH KNEES: Primary | ICD-10-CM

## 2023-03-24 DIAGNOSIS — M25.561 ACUTE PAIN OF BOTH KNEES: Primary | ICD-10-CM

## 2023-03-24 NOTE — PROGRESS NOTES
3/24  More of a quad tendon discomfort she related to impact and running up hills  Completed donkey curls  LB ATC    3/22  Pt states that her legs are sore  Completed walking exercises then IASTM  LB ATC    3/15/23  Pt completed step downs and full body release  LB ATC    3/13/23:  Pt completed ice cup on her foot/lower leg  She will attempt different tapings tomorrow for practice  CM    3/13/23  A: Right Ankle strain  S: Pt states that she feels in inversion with her ankle during jessi drill this AM  She was able to continue but limited during lift due to pain  O: AROM inv worst and MTT DF and eversion worst  3D foot imaging available  P: Will attempt to tape for jumping and warmup to determine if it would be beneficial for her to jump tomorrow    LB ATC    3/21/23  Left ankle sprain  Foam houses 8x ea  3 way Band Red 3x12  Slant Board Stretches  Calf raises 3x8

## 2023-03-27 ENCOUNTER — ATHLETIC TRAINING (OUTPATIENT)
Dept: SPORTS MEDICINE | Facility: OTHER | Age: 18
End: 2023-03-27

## 2023-03-27 DIAGNOSIS — M25.572 ACUTE LEFT ANKLE PAIN: Primary | ICD-10-CM

## 2023-03-28 NOTE — PROGRESS NOTES
3/27:  Pt completed her first practice today without any tape  Pt states that they wake up and can feel discomfort in their lateral ligaments of both ankles  Today pt completed 4-way ankle exercises and ultrasound for ankle discomfort  Pt was also shown exercises for their patellar tendon  CM    3/24  More of a quad tendon discomfort she related to impact and running up hills  Completed donkey curls  LB ATC     3/22  Pt states that her legs are sore  Completed walking exercises then IASTM  LB ATC     3/15/23  Pt completed step downs and full body release  LB ATC     3/13/23:  Pt completed ice cup on her foot/lower leg  She will attempt different tapings tomorrow for practice  CM     3/13/23  A: Right Ankle strain  S: Pt states that she feels in inversion with her ankle during jessi drill this AM  She was able to continue but limited during lift due to pain  O: AROM inv worst and MTT DF and eversion worst  3D foot imaging available  P: Will attempt to tape for jumping and warmup to determine if it would be beneficial for her to jump tomorrow    LB ATC     3/21/23  Left ankle sprain  Foam houses 8x ea  3 way Band Red 3x12  Slant Board Stretches  Calf raises 3x8

## 2023-03-29 ENCOUNTER — ATHLETIC TRAINING (OUTPATIENT)
Dept: SPORTS MEDICINE | Facility: OTHER | Age: 18
End: 2023-03-29

## 2023-03-29 DIAGNOSIS — M25.561 ACUTE PAIN OF BOTH KNEES: Primary | ICD-10-CM

## 2023-03-29 DIAGNOSIS — M25.562 ACUTE PAIN OF BOTH KNEES: Primary | ICD-10-CM

## 2023-03-29 NOTE — PROGRESS NOTES
3/29  Pt states her knee are sore  She feels that her body is tired  LB ATC    3/27:  Pt completed her first practice today without any tape  Pt states that they wake up and can feel discomfort in their lateral ligaments of both ankles  Today pt completed 4-way ankle exercises and ultrasound for ankle discomfort  Pt was also shown exercises for their patellar tendon  CM    3/24  More of a quad tendon discomfort she related to impact and running up hills  Completed donkey curls  LB ATC     3/22  Pt states that her legs are sore  Completed walking exercises then IASTM  LB ATC     3/15/23  Pt completed step downs and full body release  LB ATC     3/13/23:  Pt completed ice cup on her foot/lower leg  She will attempt different tapings tomorrow for practice  CM     3/13/23  A: Right Ankle strain  S: Pt states that she feels in inversion with her ankle during jessi drill this AM  She was able to continue but limited during lift due to pain  O: AROM inv worst and MTT DF and eversion worst  3D foot imaging available  P: Will attempt to tape for jumping and warmup to determine if it would be beneficial for her to jump tomorrow    LB ATC     3/21/23  Left ankle sprain  Foam houses 8x ea  3 way Band Red 3x12  Slant Board Stretches  Calf raises 3x8

## 2023-04-02 ENCOUNTER — ATHLETIC TRAINING (OUTPATIENT)
Dept: SPORTS MEDICINE | Facility: OTHER | Age: 18
End: 2023-04-02

## 2023-04-02 DIAGNOSIS — M25.572 ACUTE LEFT ANKLE PAIN: ICD-10-CM

## 2023-04-02 DIAGNOSIS — M25.561 ACUTE PAIN OF BOTH KNEES: Primary | ICD-10-CM

## 2023-04-02 DIAGNOSIS — M25.562 ACUTE PAIN OF BOTH KNEES: Primary | ICD-10-CM

## 2023-04-02 NOTE — PROGRESS NOTES
"3/31  Pt states that they are still having discomfort in their ankles and knees  Pt complete \"feel good\" activities in order to practice fully  Pt thanked AT after practice and stated they \"felt very good\" practicing  CM  3/29  Pt states her knee are sore  She feels that her body is tired  LB ATC     3/27:  Pt completed her first practice today without any tape  Pt states that they wake up and can feel discomfort in their lateral ligaments of both ankles  Today pt completed 4-way ankle exercises and ultrasound for ankle discomfort  Pt was also shown exercises for their patellar tendon  CM     3/24  More of a quad tendon discomfort she related to impact and running up hills  Completed donkey curls  LB ATC     3/22  Pt states that her legs are sore  Completed walking exercises then IASTM  LB ATC     3/15/23  Pt completed step downs and full body release  LB ATC     3/13/23:  Pt completed ice cup on her foot/lower leg  She will attempt different tapings tomorrow for practice  CM     3/13/23  A: Right Ankle strain  S: Pt states that she feels in inversion with her ankle during jessi drill this AM  She was able to continue but limited during lift due to pain  O: AROM inv worst and MTT DF and eversion worst  3D foot imaging available  P: Will attempt to tape for jumping and warmup to determine if it would be beneficial for her to jump tomorrow    LB ATC     3/21/23  Left ankle sprain  Foam houses 8x ea  3 way Band Red 3x12  Slant Board Stretches  Calf raises 3x8  "

## 2023-04-03 ENCOUNTER — ATHLETIC TRAINING (OUTPATIENT)
Dept: SPORTS MEDICINE | Facility: OTHER | Age: 18
End: 2023-04-03

## 2023-04-03 DIAGNOSIS — S76.111A STRAIN OF RIGHT QUADRICEPS, INITIAL ENCOUNTER: Primary | ICD-10-CM

## 2023-04-03 NOTE — PROGRESS NOTES
4/3  A: Right Quad strain  S: Pain started during sprinting up hills  O: Right quad and patella tracking pain  P: Focusing on pain management then squat loading exercises    LB ATC

## 2023-04-05 ENCOUNTER — ATHLETIC TRAINING (OUTPATIENT)
Dept: SPORTS MEDICINE | Facility: OTHER | Age: 18
End: 2023-04-05

## 2023-04-05 DIAGNOSIS — M54.50 CHRONIC BILATERAL LOW BACK PAIN WITHOUT SCIATICA: Primary | ICD-10-CM

## 2023-04-05 DIAGNOSIS — G89.29 CHRONIC BILATERAL LOW BACK PAIN WITHOUT SCIATICA: Primary | ICD-10-CM

## 2023-04-05 NOTE — PROGRESS NOTES
"Athletic Training Progress Note     Starr Woo  Age: 17 y o       Assessment/Plan:   Visit Diagnosis: Mid back pain      Treatment Plan:   []???  Follow-up PRN    []???  Follow-up prior to next practice/game for re-evaluation  [x]? ??  Daily treatment/rehab  Progress note expected weekly       Subjective: Ath states her mid back is bothering her  She doesn't remember doing anything specific       Objective:   See treatment log below     Treatment Log:  Date: 2/10/23 11/9/22   Playing Status: As tolerated As tolerated           Exercise/Treatment       MHP   10min   Knee windshield wipers   3x10   Pelvic tilts   2x10 (focus on breathing)   Cupping 5'     [place] 3x10                      4/5  Pt states that her low back is the most discomfort today  Completed STM and functional movements  LB ATC    3/20  Discharging leg pain episode and moving to a prevention episode  LB ATC    3/14:  Pt states they have back pain from playing soccer yesterday  Pt was treated with electrical stimulation and heat  CM    2/28  Pt was able to jump fully and has not been seen for back pain in the past week  Discharged  LB ATC     2/20  Pt states that they still have discomfort in their back and does not understand why she experiences the discomfort so often  She also said she experienced \"'intense cramping pain\" while practicing taping in class  Pt completed TMR and electrical stimulation for her back pain and IASTM for lower leg pain  CM     2/17  Pt states that her back is tight extending into her neck  She believes that the changes related to her mental wellness are adding to his discomfort  LB ATC     2/15  Pt states that her challenges or more than physical  Focused on unloading tissues and pain relieve  LB ATC     2/13:  PT states that she is experiencing left hip pain again after her competition this weekend  She completed electrical stimulation for her low back discomfort and cupping for chronic back pain   She also " asked for US for her tendinopathy of left ankle  CM     2/10  Pt states that her back is sore  She wanted to loosen her back and wanted to cup  LB ATC     1/19/23  Resolved    LB ATC

## 2023-04-07 ENCOUNTER — ATHLETIC TRAINING (OUTPATIENT)
Dept: SPORTS MEDICINE | Facility: OTHER | Age: 18
End: 2023-04-07

## 2023-04-07 DIAGNOSIS — S76.311D HAMSTRING STRAIN, RIGHT, SUBSEQUENT ENCOUNTER: Primary | ICD-10-CM

## 2023-04-07 NOTE — PROGRESS NOTES
4/7/23  Completed firing patterns hamstring to glute to back, lunge strides, MWM, and premod low frequency on arches    LB ATC

## 2023-04-24 ENCOUNTER — ATHLETIC TRAINING (OUTPATIENT)
Dept: SPORTS MEDICINE | Facility: OTHER | Age: 18
End: 2023-04-24

## 2023-04-24 DIAGNOSIS — S96.911A STRAIN OF GREAT TOE OF RIGHT FOOT, INITIAL ENCOUNTER: Primary | ICD-10-CM

## 2023-04-24 DIAGNOSIS — S76.311D HAMSTRING STRAIN, RIGHT, SUBSEQUENT ENCOUNTER: Primary | ICD-10-CM

## 2023-04-25 ENCOUNTER — ATHLETIC TRAINING (OUTPATIENT)
Dept: SPORTS MEDICINE | Facility: OTHER | Age: 18
End: 2023-04-25

## 2023-04-25 DIAGNOSIS — M25.562 LEFT KNEE PAIN, UNSPECIFIED CHRONICITY: Primary | ICD-10-CM

## 2023-04-25 NOTE — PROGRESS NOTES
4/24  Pt states that her toe is no longer an issue  LB ATC    4/21  Pain reduced and now more arch than toe  Able to compete this afternoon without hamstring pain  LB ATC    4/20/23  A: Right toe strain  S: Pt states that her toe has been bothering her for 5 days  Was worst today during running  O: Limited extension in passive push  P: Attempted a taping for walking around  Will continue to work to restore full motion    LB ATC

## 2023-04-25 NOTE — PROGRESS NOTES
AT Initial Evaluation    Name: Gabriela Aleman  Age: 21 y o  Sport: Outdoor track and field   Date of Assessment: 4/25/2023    Assessment/Plan:   Visit Diagnosis: Left knee pain    Treatment Plan:   []  Follow-up PRN  []  Follow-up prior to next practice/game for re-evaluation  [x]  Daily treatment/rehab  Progress note expected weekly  Referral:   [x]  Not needed at this time  []  Referred to:     Subjective: Pt comes in complaining of left knee pain  Pt has PMHx of left knee patella tendonitis and medial glide  Pt feels her pain during walking, sitting to standing, and stairs   She has done PT for this knee in the past  Pt requested stim on her knee today due to pain and that has helped it in the past      Objective:   Medial tracking patella    - 10 squats pain at 4-5/10   - Jacob tape applied    - 10 squats pain at 0/10    Treatment Log:  Date:  4/25/23   Playing Status: As tolerated       Exercise/Treatment    Pain relief TENS 20min

## 2023-04-25 NOTE — PROGRESS NOTES
"Athletic Training Progress Note    Name: Varinder Guillory  Age: 21 y o  Assessment/Plan:     Visit Diagnosis: Hamstring strain, right, subsequent encounter [[id number]]    Treatment Plan: Pain management, Deep Tissue Work, Begin loading explosively    []  Follow-up PRN  []  Follow-up prior to next practice/game for re-evaluation  [x]  Daily treatment/rehab  Progress note expected weekly  Subjective: Pt reported to the [facility] today to cont treatment on their right thigh  Pt stated they rested all weekend  Pt did mention they had some arch discomfort still that had during last visit  Pt has aspiration of qualifying for MACs, regionals, and NCAAs  Pt reports decreased hamstring and quad discomfort  Pt mentioned she was able to participate in \"pickup\" soccer over the weekend with no discomfort  Pt stated she does feel discomfort still with explosive type movements  See Below for what Ajit Sarah believes she will need to accomplish her qualifying goals  \"For her to be ready to compete she will need at least:    1week of 70% speed and 70% jump intensity   1week of 80% speed and 80% jump intensity     We have 3 weeks until last opportunity for her to qualify for MAC'  We have 4 weeks for last opportunity (at [facility]) to qualify for Regionals  We have 6 weeks for her last chance (Regionals) to qualify for nationals     For MAC'S she can qualify during the 80% week  For Regionals she can qualify during 90% week  For Nationals she could potentially qualify, week after 100% training week  \"     Objective:   See treatment log below  RSI SL hop 3x cont Left Trial 1 Right Trial 1 Left Trial 2 Right Trial 2   avg 1 09 1 10 1 08 1 33   Avg contact time 0 405s 0 812s 0 735s 0 357s   Avg Jump height 8 3 9 0 8 0 8 6in     HHD Prone 90 deg Left Right HHD Prone Hip extension Left Right   Trial 1 34 4 30 9 Trial 1 45 3 49 8   Trial 2 29 9 31 7 Trial 2 44 6 48 5   Trial 3 28 4 26 9 Trial 3 44 1 42 7       Treatment Log:     Date: " 4/19 4/18 4/13 4/12 4/10/23   Playing Status: Limited Modified Modified Modified Modified           Exercise/Treatment        Hamstring, glute, to back firing pattern    x3 3x5x5   BW squats flat foot     2x15   BW squats elevated heel     2x8   premod low frequency on arches     15min seated for 7min and standing for 8min   Quad stretch     2x40s   Supine heel drags     2x10   Stool scoots    3laps 4laps   Achilles work    X    [place]into Leans   8x2     Steamboats  2x50 2x50     Scropions x7       4/24  Pt states that her hamstrings did not bother her during lift today but her quad did  LB ATC  4/19  Completed sprints at practice this morning  Felt slight discomfort in scropions and nerve pulling  She will continue to push herself physically and manger herself mentally  LB ATC    4/18  Pt was assessed with manual muscle tests and RSI (see objective section above)  Pt was permitted to do short approaches at practice today based off findings with RSI and HHD  Discomfort was provoked when completing a maximal contraction between  degrees of flexion  Reassess Wednesday 4/19 or Thursday 4/20 for competition on Friday 4/21  CY ATC      4/17  Strength limited 35 to 20 lbs in hamstring and glute lift off  No jumping  Reassess Wednesday 4/19  LB ATC    4/14  Completed running at practice  Continued endurance to strength work and can transition to power and speed  LB ATC    4/13  Pt states that her lift felt better and she was able to complete the nordic lean  LB ATC    4/12/23  Pt states that her hamstring is tight from the morning  LB ATC    Pt was counseled on what she was able to do for lift today(low resistant lower extremity movements, squats lunges ect)

## 2023-04-26 ENCOUNTER — ATHLETIC TRAINING (OUTPATIENT)
Dept: SPORTS MEDICINE | Facility: OTHER | Age: 18
End: 2023-04-26

## 2023-04-26 DIAGNOSIS — M25.562 LEFT KNEE PAIN, UNSPECIFIED CHRONICITY: Primary | ICD-10-CM

## 2023-04-26 NOTE — PROGRESS NOTES
AT Initial Evaluation    Name: Dorotha Duverney  Age: 21 y o  Sport: Outdoor track and field   Date of Assessment: 4/26/2023    Assessment/Plan:   Visit Diagnosis: Left knee pain    Treatment Plan:   []  Follow-up PRN  []  Follow-up prior to next practice/game for re-evaluation  [x]  Daily treatment/rehab  Progress note expected weekly  Referral:   [x]  Not needed at this time  []  Referred to:     Subjective: Pt comes in complaining of left knee pain  Pt has PMHx of left knee patella tendonitis and medial glide  Pt feels her pain during walking, sitting to standing, and stairs  She has done PT for this knee in the past  Pt requested stim on her knee today due to pain and that has helped it in the past      Objective:   Medial tracking patella    - 10 squats pain at 4-5/10   - Jacob tape applied    - 10 squats pain at 0/10    Treatment Log:  Date:  4/25/23   Playing Status: As tolerated       Exercise/Treatment    Pain relief TENS 20min         4/26  Pt was upset about how her practice went and the expectations of her  not matching with her expectations  Completed elevation and full body mobilizations to assist in her recovery    LB ATC

## 2023-04-27 ENCOUNTER — ATHLETIC TRAINING (OUTPATIENT)
Dept: SPORTS MEDICINE | Facility: OTHER | Age: 18
End: 2023-04-27

## 2023-04-27 DIAGNOSIS — M79.672 ARCH PAIN OF LEFT FOOT: Primary | ICD-10-CM

## 2023-04-28 ENCOUNTER — ATHLETIC TRAINING (OUTPATIENT)
Dept: SPORTS MEDICINE | Facility: OTHER | Age: 18
End: 2023-04-28

## 2023-04-28 DIAGNOSIS — S96.911A STRAIN OF GREAT TOE OF RIGHT FOOT, INITIAL ENCOUNTER: Primary | ICD-10-CM

## 2023-04-28 NOTE — PROGRESS NOTES
"Athletic Training Progress Note     Aminah Elizondo  Age: 17 y o       Assessment/Plan:   Visit Diagnosis: Mid back pain      Treatment Plan:   []???  Follow-up PRN    []???  Follow-up prior to next practice/game for re-evaluation  [x]? ??  Daily treatment/rehab  Progress note expected weekly       Subjective: Ath states her mid back is bothering her  She doesn't remember doing anything specific       Objective:   See treatment log below     Treatment Log:  Date: 2/10/23 11/9/22   Playing Status: As tolerated As tolerated           Exercise/Treatment       MHP   10min   Knee windshield wipers   3x10   Pelvic tilts   2x10 (focus on breathing)   Cupping 5'     [place] 3x10                     4/27  Completed parrafin wax on foot and then continued mobs and tri extension  Will attempt CUS tomorrow  LB ATC    4/14  Completed arch and landing exercises  Also neck relaxation exercises  LB ATC     4/5  Pt states that her low back is the most discomfort today  Completed STM and functional movements  LB ATC    3/20  Discharging leg pain episode and moving to a prevention episode  LB ATC    3/14:  Pt states they have back pain from playing soccer yesterday  Pt was treated with electrical stimulation and heat  CM    2/28  Pt was able to jump fully and has not been seen for back pain in the past week  Discharged  LB ATC     2/20  Pt states that they still have discomfort in their back and does not understand why she experiences the discomfort so often  She also said she experienced \"'intense cramping pain\" while practicing taping in class  Pt completed TMR and electrical stimulation for her back pain and IASTM for lower leg pain  CM     2/17  Pt states that her back is tight extending into her neck  She believes that the changes related to her mental wellness are adding to his discomfort  LB ATC     2/15  Pt states that her challenges or more than physical  Focused on unloading tissues and pain relieve    LB " ATC     2/13:  PT states that she is experiencing left hip pain again after her competition this weekend  She completed electrical stimulation for her low back discomfort and cupping for chronic back pain  She also asked for US for her tendinopathy of left ankle  CM     2/10  Pt states that her back is sore  She wanted to loosen her back and wanted to cup  LB ATC     1/19/23  Resolved    LB ATC

## 2023-04-28 NOTE — PROGRESS NOTES
"Athletic Training Progress Note     Jose E Glover  Age: 17 y o       Assessment/Plan:   Visit Diagnosis: Mid back pain      Treatment Plan:   []???  Follow-up PRN    []???  Follow-up prior to next practice/game for re-evaluation  [x]? ??  Daily treatment/rehab  Progress note expected weekly       Subjective: Ath states her mid back is bothering her  She doesn't remember doing anything specific       Objective:   See treatment log below     Treatment Log:  Date: 2/10/23 11/9/22   Playing Status: As tolerated As tolerated           Exercise/Treatment       MHP   10min   Knee windshield wipers   3x10   Pelvic tilts   2x10 (focus on breathing)   Cupping 5'     [place] 3x10                     4/28  Pt states that her shins are sore  Completed toe CUS and contrast bath for shins  LB ATC    4/27  Completed parrafin wax on foot and then continued mobs and tri extension  Will attempt CUS tomorrow  LB ATC    4/14  Completed arch and landing exercises  Also neck relaxation exercises  LB ATC     4/5  Pt states that her low back is the most discomfort today  Completed STM and functional movements  LB ATC    3/20  Discharging leg pain episode and moving to a prevention episode  LB ATC    3/14:  Pt states they have back pain from playing soccer yesterday  Pt was treated with electrical stimulation and heat  CM    2/28  Pt was able to jump fully and has not been seen for back pain in the past week  Discharged  LB ATC     2/20  Pt states that they still have discomfort in their back and does not understand why she experiences the discomfort so often  She also said she experienced \"'intense cramping pain\" while practicing taping in class  Pt completed TMR and electrical stimulation for her back pain and IASTM for lower leg pain  CM     2/17  Pt states that her back is tight extending into her neck  She believes that the changes related to her mental wellness are adding to his discomfort    LB ATC     2/15  Pt states " that her challenges or more than physical  Focused on unloading tissues and pain relieve  LB ATC     2/13:  PT states that she is experiencing left hip pain again after her competition this weekend  She completed electrical stimulation for her low back discomfort and cupping for chronic back pain  She also asked for US for her tendinopathy of left ankle  CM     2/10  Pt states that her back is sore  She wanted to loosen her back and wanted to cup  LB ATC     1/19/23  Resolved    LB ATC

## 2023-05-03 ENCOUNTER — ATHLETIC TRAINING (OUTPATIENT)
Dept: SPORTS MEDICINE | Facility: OTHER | Age: 18
End: 2023-05-03

## 2023-05-03 DIAGNOSIS — S96.911A STRAIN OF GREAT TOE OF RIGHT FOOT, INITIAL ENCOUNTER: Primary | ICD-10-CM

## 2023-05-04 ENCOUNTER — ATHLETIC TRAINING (OUTPATIENT)
Dept: SPORTS MEDICINE | Facility: OTHER | Age: 18
End: 2023-05-04

## 2023-05-04 DIAGNOSIS — S96.911A STRAIN OF GREAT TOE OF RIGHT FOOT, INITIAL ENCOUNTER: Primary | ICD-10-CM

## 2023-05-04 NOTE — PROGRESS NOTES
"Athletic Training Progress Note     Fay Petersen  Age: 17 y o       Assessment/Plan:   Visit Diagnosis: Mid back pain      Treatment Plan:   []???  Follow-up PRN    []???  Follow-up prior to next practice/game for re-evaluation  [x]? ??  Daily treatment/rehab  Progress note expected weekly       Subjective: Ath states her mid back is bothering her  She doesn't remember doing anything specific       Objective:   See treatment log below     Treatment Log:  Date: 2/10/23 11/9/22   Playing Status: As tolerated As tolerated           Exercise/Treatment       MHP   10min   Knee windshield wipers   3x10   Pelvic tilts   2x10 (focus on breathing)   Cupping 5'     [place] 3x10                     5/3  Pt shared that she will not be \"allowed\" to compete at [employer] as she is receiving punishment  Worked on toe mobilizations and groin releases  LB ATC    4/28  Pt states that her shins are sore  Completed toe CUS and contrast bath for shins  LB ATC    4/27  Completed parrafin wax on foot and then continued mobs and tri extension  Will attempt CUS tomorrow  LB ATC    4/14  Completed arch and landing exercises  Also neck relaxation exercises  LB ATC     4/5  Pt states that her low back is the most discomfort today  Completed STM and functional movements  LB ATC    3/20  Discharging leg pain episode and moving to a prevention episode  LB ATC    3/14:  Pt states they have back pain from playing soccer yesterday  Pt was treated with electrical stimulation and heat  CM    2/28  Pt was able to jump fully and has not been seen for back pain in the past week  Discharged  LB ATC     2/20  Pt states that they still have discomfort in their back and does not understand why she experiences the discomfort so often  She also said she experienced \"'intense cramping pain\" while practicing taping in class  Pt completed TMR and electrical stimulation for her back pain and IASTM for lower leg pain    CM     2/17  Pt states that her " back is tight extending into her neck  She believes that the changes related to her mental wellness are adding to his discomfort  LB ATC     2/15  Pt states that her challenges or more than physical  Focused on unloading tissues and pain relieve  LB ATC     2/13:  PT states that she is experiencing left hip pain again after her competition this weekend  She completed electrical stimulation for her low back discomfort and cupping for chronic back pain  She also asked for US for her tendinopathy of left ankle  CM     2/10  Pt states that her back is sore  She wanted to loosen her back and wanted to cup  LB ATC     1/19/23  Resolved    LB ATC

## 2023-05-04 NOTE — PROGRESS NOTES
"Athletic Training Progress Note     Ana Paula Nelson  Age: 17 y o       Assessment/Plan:   Visit Diagnosis: Mid back pain      Treatment Plan:   []???  Follow-up PRN    []???  Follow-up prior to next practice/game for re-evaluation  [x]? ??  Daily treatment/rehab  Progress note expected weekly       Subjective: Ath states her mid back is bothering her  She doesn't remember doing anything specific       Objective:   See treatment log below     Treatment Log:  Date: 2/10/23 11/9/22   Playing Status: As tolerated As tolerated           Exercise/Treatment       MHP   10min   Knee windshield wipers   3x10   Pelvic tilts   2x10 (focus on breathing)   Cupping 5'     [place] 3x10                     5/4  Completed endurance grab ground working up the body  LB ATC    5/3  Pt shared that she will not be \"allowed\" to compete at [employer] as she is receiving punishment  Worked on toe mobilizations and groin releases  LB ATC    4/28  Pt states that her shins are sore  Completed toe CUS and contrast bath for shins  LB ATC    4/27  Completed parrafin wax on foot and then continued mobs and tri extension  Will attempt CUS tomorrow  LB ATC    4/14  Completed arch and landing exercises  Also neck relaxation exercises  LB ATC     4/5  Pt states that her low back is the most discomfort today  Completed STM and functional movements  LB ATC    3/20  Discharging leg pain episode and moving to a prevention episode  LB ATC    3/14:  Pt states they have back pain from playing soccer yesterday  Pt was treated with electrical stimulation and heat  CM    2/28  Pt was able to jump fully and has not been seen for back pain in the past week  Discharged  LB ATC     2/20  Pt states that they still have discomfort in their back and does not understand why she experiences the discomfort so often  She also said she experienced \"'intense cramping pain\" while practicing taping in class   Pt completed TMR and electrical stimulation for her back " pain and IASTM for lower leg pain  CM     2/17  Pt states that her back is tight extending into her neck  She believes that the changes related to her mental wellness are adding to his discomfort  LB ATC     2/15  Pt states that her challenges or more than physical  Focused on unloading tissues and pain relieve  LB ATC     2/13:  PT states that she is experiencing left hip pain again after her competition this weekend  She completed electrical stimulation for her low back discomfort and cupping for chronic back pain  She also asked for US for her tendinopathy of left ankle  CM     2/10  Pt states that her back is sore  She wanted to loosen her back and wanted to cup  LB ATC     1/19/23  Resolved    LB ATC

## 2023-05-08 ENCOUNTER — ATHLETIC TRAINING (OUTPATIENT)
Dept: SPORTS MEDICINE | Facility: OTHER | Age: 18
End: 2023-05-08

## 2023-05-08 DIAGNOSIS — M25.572 ACUTE LEFT ANKLE PAIN: Primary | ICD-10-CM

## 2023-05-08 DIAGNOSIS — S89.82XA HYPEREXTENSION INJURY OF LEFT KNEE, INITIAL ENCOUNTER: Primary | ICD-10-CM

## 2023-05-08 DIAGNOSIS — M77.8 TENDINITIS OF BOTH WRISTS: Primary | ICD-10-CM

## 2023-05-08 NOTE — PROGRESS NOTES
5/8/23  A: Left Knee Hyperextension  S: Felt that her Left knee extended during a jump at the [facility] championship  O: Discomfort in knee extension  P: Focused on full body recovery  Will monitor her knee    LB ATC

## 2023-05-08 NOTE — PROGRESS NOTES
"Athletic Training Progress Note     Hosie Libman  Age: 17 y o       Assessment/Plan:   Visit Diagnosis: Mid back pain      Treatment Plan:   []???  Follow-up PRN    []???  Follow-up prior to next practice/game for re-evaluation  [x]? ??  Daily treatment/rehab  Progress note expected weekly       Subjective: Ath states her mid back is bothering her  She doesn't remember doing anything specific       Objective:   See treatment log below     Treatment Log:  Date: 2/10/23 11/9/22   Playing Status: As tolerated As tolerated           Exercise/Treatment       MHP   10min   Knee windshield wipers   3x10   Pelvic tilts   2x10 (focus on breathing)   Cupping 5'     [place] 3x10                     5/8  Focused on being a well-being athlete  The DPAS was 44  She will continue to work on accepting the end of the season and the way it ended  LB ATC    4/27  Completed parrafin wax on foot and then continued mobs and tri extension  Will attempt CUS tomorrow  LB ATC    4/14  Completed arch and landing exercises  Also neck relaxation exercises  LB ATC     4/5  Pt states that her low back is the most discomfort today  Completed STM and functional movements  LB ATC    3/20  Discharging leg pain episode and moving to a prevention episode  LB ATC    3/14:  Pt states they have back pain from playing soccer yesterday  Pt was treated with electrical stimulation and heat  CM    2/28  Pt was able to jump fully and has not been seen for back pain in the past week  Discharged  LB ATC     2/20  Pt states that they still have discomfort in their back and does not understand why she experiences the discomfort so often  She also said she experienced \"'intense cramping pain\" while practicing taping in class  Pt completed TMR and electrical stimulation for her back pain and IASTM for lower leg pain  CM     2/17  Pt states that her back is tight extending into her neck   She believes that the changes related to her mental wellness are " adding to his discomfort  LB ATC     2/15  Pt states that her challenges or more than physical  Focused on unloading tissues and pain relieve  LB ATC     2/13:  PT states that she is experiencing left hip pain again after her competition this weekend  She completed electrical stimulation for her low back discomfort and cupping for chronic back pain  She also asked for US for her tendinopathy of left ankle  CM     2/10  Pt states that her back is sore  She wanted to loosen her back and wanted to cup  LB ATC     1/19/23  Resolved    LB ATC

## 2023-05-08 NOTE — PROGRESS NOTES
AT Initial Evaluation    Name: Carey Butt  Age: 21 y o  Sport: Outdoor track and field   Date of Assessment: 5/8/2023    Assessment/Plan:   Visit Diagnosis: Left knee pain    Treatment Plan:   []  Follow-up PRN  []  Follow-up prior to next practice/game for re-evaluation  [x]  Daily treatment/rehab  Progress note expected weekly  Referral:   [x]  Not needed at this time  []  Referred to:     Subjective: Pt comes in complaining of left knee pain  Pt has PMHx of left knee patella tendonitis and medial glide  Pt feels her pain during walking, sitting to standing, and stairs  She has done PT for this knee in the past  Pt requested stim on her knee today due to pain and that has helped it in the past      Objective:   Medial tracking patella    - 10 squats pain at 4-5/10   - Jacob tape applied    - 10 squats pain at 0/10    Treatment Log:  Date:  4/25/23   Playing Status: As tolerated       Exercise/Treatment    Pain relief TENS 20min         5/8  Pt states that her knee hyperextended during the [id number] 5/6  LB ATC    4/26  Pt was upset about how her practice went and the expectations of her  not matching with her expectations  Completed elevation and full body mobilizations to assist in her recovery    LB ATC

## 2023-05-11 ENCOUNTER — ATHLETIC TRAINING (OUTPATIENT)
Dept: SPORTS MEDICINE | Facility: OTHER | Age: 18
End: 2023-05-11

## 2023-05-11 DIAGNOSIS — S89.82XA HYPEREXTENSION INJURY OF LEFT KNEE, INITIAL ENCOUNTER: Primary | ICD-10-CM

## 2023-05-11 NOTE — PROGRESS NOTES
5/11  Completed hamstring and quad exercises  LB ATC    5/8/23  A: Left Knee Hyperextension  S: Felt that her Left knee extended during a jump at the [facility] championship  O: Discomfort in knee extension  P: Focused on full body recovery  Will monitor her knee    LB ATC

## 2023-05-12 ENCOUNTER — ATHLETIC TRAINING (OUTPATIENT)
Dept: SPORTS MEDICINE | Facility: OTHER | Age: 18
End: 2023-05-12

## 2023-05-12 DIAGNOSIS — S89.82XA HYPEREXTENSION INJURY OF LEFT KNEE, INITIAL ENCOUNTER: Primary | ICD-10-CM

## 2023-05-16 ENCOUNTER — ATHLETIC TRAINING (OUTPATIENT)
Dept: SPORTS MEDICINE | Facility: OTHER | Age: 18
End: 2023-05-16

## 2023-05-16 DIAGNOSIS — S89.82XA HYPEREXTENSION INJURY OF LEFT KNEE, INITIAL ENCOUNTER: Primary | ICD-10-CM

## 2023-05-16 NOTE — PROGRESS NOTES
5/16  Pt focused on knee hyperextension movement prevention and then went through hamstring prevention exercises and provided a plan for the summer  Exercises plans were also provided for quad and hip exercises  LB ATC    5/12  Strength measurements completed Quad L 33 to R 31 and Hamstring L 38 R 35  Pain with both in medial knee  Provide hip ext and knee flexion exercise and will provide more summer programing  LB ATC    5/11  Completed hamstring and quad exercises  LB ATC    5/8/23  A: Left Knee Hyperextension  S: Felt that her Left knee extended during a jump at the [facility] championship  O: Discomfort in knee extension  P: Focused on full body recovery  Will monitor her knee    LB ATC

## 2023-06-06 ENCOUNTER — ATHLETIC TRAINING (OUTPATIENT)
Dept: SPORTS MEDICINE | Facility: OTHER | Age: 18
End: 2023-06-06

## 2023-06-06 DIAGNOSIS — M79.672 LEFT FOOT PAIN: Primary | ICD-10-CM

## 2023-06-06 DIAGNOSIS — S76.311A STRAIN OF RIGHT HAMSTRING MUSCLE, INITIAL ENCOUNTER: ICD-10-CM

## 2023-06-21 ENCOUNTER — ATHLETIC TRAINING (OUTPATIENT)
Dept: SPORTS MEDICINE | Facility: OTHER | Age: 18
End: 2023-06-21

## 2023-06-21 DIAGNOSIS — G89.29 CHRONIC PAIN OF LEFT KNEE: Primary | ICD-10-CM

## 2023-06-21 DIAGNOSIS — M25.562 CHRONIC PAIN OF LEFT KNEE: Primary | ICD-10-CM

## 2023-09-04 ENCOUNTER — ATHLETIC TRAINING (OUTPATIENT)
Dept: SPORTS MEDICINE | Facility: OTHER | Age: 18
End: 2023-09-04

## 2023-09-04 DIAGNOSIS — S76.111A QUADRICEPS STRAIN, RIGHT, INITIAL ENCOUNTER: Primary | ICD-10-CM

## 2023-09-05 ENCOUNTER — ATHLETIC TRAINING (OUTPATIENT)
Dept: SPORTS MEDICINE | Facility: OTHER | Age: 18
End: 2023-09-05

## 2023-09-05 DIAGNOSIS — S76.111A QUADRICEPS STRAIN, RIGHT, INITIAL ENCOUNTER: Primary | ICD-10-CM

## 2023-09-05 NOTE — PROGRESS NOTES
9/4:  S: Pt states that she has quadriceps pain after her cross country practice. She states that she completed a lot of core exercise with her legs elevated in the air. O: Pain with MMT of right quad  A: Quad irritation  P: Begin therapeutic rehabilitation.   CM

## 2023-09-06 NOTE — PROGRESS NOTES
9/4:  S: Pt states that she has quadriceps pain after her cross country practice. She states that she completed a lot of core exercise with her legs elevated in the air. O: Pain with MMT of right quad  A: Quad irritation  P: Begin therapeutic rehabilitation. RADHA    9/5:  Pt completed single leg glute bridges and primal reflex technique to hamstrings. She also asked to complete IASTM on achilles tendons.   RADHA

## 2023-09-07 ENCOUNTER — ATHLETIC TRAINING (OUTPATIENT)
Dept: SPORTS MEDICINE | Facility: OTHER | Age: 18
End: 2023-09-07

## 2023-09-07 DIAGNOSIS — S76.111A QUADRICEPS STRAIN, RIGHT, INITIAL ENCOUNTER: Primary | ICD-10-CM

## 2023-09-07 NOTE — PROGRESS NOTES
9/4:  S: Pt states that she has quadriceps pain after her cross country practice. She states that she completed a lot of core exercise with her legs elevated in the air. O: Pain with MMT of right quad  A: Quad irritation  P: Begin therapeutic rehabilitation. CM    9/5:  Pt completed single leg glute bridges and primal reflex technique to hamstrings. She also asked to complete IASTM on achilles tendons. CM    9/7  Completed core exercises. Pt is frustrated by how the  is talking to her.   LB ATC

## 2023-09-12 ENCOUNTER — ATHLETIC TRAINING (OUTPATIENT)
Dept: SPORTS MEDICINE | Facility: OTHER | Age: 18
End: 2023-09-12

## 2023-09-12 DIAGNOSIS — M22.2X2 PATELLOFEMORAL PAIN SYNDROME OF BOTH KNEES: ICD-10-CM

## 2023-09-12 DIAGNOSIS — M22.2X1 PATELLOFEMORAL PAIN SYNDROME OF BOTH KNEES: ICD-10-CM

## 2023-09-12 DIAGNOSIS — S76.111D QUADRICEPS STRAIN, RIGHT, SUBSEQUENT ENCOUNTER: Primary | ICD-10-CM

## 2023-09-12 NOTE — PROGRESS NOTES
9/12:   S: Pt states her left knee is really bothering her, and noticed her right one begin to bother her as well. She also c/o low back pain.     O: Pain with MMT of right quad; Left posterior rotation fixed with MET   A: Quad irritation; PFPS Bilateral  P: SLR supine and ABD, TKEs with ball, Glute bridge with march green band, Muscle relaxation TENS

## 2023-09-27 ENCOUNTER — ATHLETIC TRAINING (OUTPATIENT)
Dept: SPORTS MEDICINE | Facility: OTHER | Age: 18
End: 2023-09-27

## 2023-09-27 DIAGNOSIS — S76.111D QUADRICEPS STRAIN, RIGHT, SUBSEQUENT ENCOUNTER: Primary | ICD-10-CM

## 2023-09-28 ENCOUNTER — ATHLETIC TRAINING (OUTPATIENT)
Dept: SPORTS MEDICINE | Facility: OTHER | Age: 18
End: 2023-09-28

## 2023-09-28 DIAGNOSIS — M22.2X1 PATELLOFEMORAL PAIN SYNDROME OF BOTH KNEES: Primary | ICD-10-CM

## 2023-09-28 DIAGNOSIS — M22.2X2 PATELLOFEMORAL PAIN SYNDROME OF BOTH KNEES: Primary | ICD-10-CM

## 2023-09-28 NOTE — PROGRESS NOTES
9/12:   S: Pt states her left knee is really bothering her, and noticed her right one begin to bother her as well. She also c/o low back pain. O: Pain with MMT of right quad; Left posterior rotation fixed with MET   A: Quad irritation; PFPS Bilateral  P: SLR supine and ABD, TKEs with ball, Glute bridge with march green band, Muscle relaxation TENS     9/28:  Pt requested IASTM for their quads, arches, and traps. WE decreased by pain with PRT before completing manual therapies.   RADHA

## 2023-09-29 ENCOUNTER — ATHLETIC TRAINING (OUTPATIENT)
Dept: SPORTS MEDICINE | Facility: OTHER | Age: 18
End: 2023-09-29

## 2023-09-29 DIAGNOSIS — S43.431A SUPERIOR GLENOID LABRUM LESION OF RIGHT SHOULDER, INITIAL ENCOUNTER: Primary | ICD-10-CM

## 2023-09-29 NOTE — PROGRESS NOTES
9/12:   S: Pt states her left knee is really bothering her, and noticed her right one begin to bother her as well. She also c/o low back pain. O: Pain with MMT of right quad; Left posterior rotation fixed with MET   A: Quad irritation; PFPS Bilateral  P: SLR supine and ABD, TKEs with ball, Glute bridge with march green band, Muscle relaxation TENS     9/28:  Pt requested IASTM for their quads, arches, and traps. WE decreased by pain with PRT before completing manual therapies. CM    9/28  Completed lunging, mini squats and bounce squats. Pre practice activation.   LB ATC

## 2023-09-29 NOTE — PROGRESS NOTES
9/29  A: Right shoulder labrum pathology  S: Pt was completing push press over head. She felt herself get weak and was unable to hold the weight. Her Right shoulder gave out and she had her  catch the weight behind her. She had been telling the  that she was experiencing an anxiety attack. She was concerned because he did not seem to understand and ignored her. O: MDLS pain  YBT 55/15 Left inferiomedial  P: Completed unload for back and shoulder freeing exercises.   LB ATC

## 2023-09-30 ENCOUNTER — ATHLETIC TRAINING (OUTPATIENT)
Dept: SPORTS MEDICINE | Facility: OTHER | Age: 18
End: 2023-09-30

## 2023-09-30 DIAGNOSIS — S43.431A SUPERIOR GLENOID LABRUM LESION OF RIGHT SHOULDER, INITIAL ENCOUNTER: Primary | ICD-10-CM

## 2023-09-30 NOTE — PROGRESS NOTES
9/30  Numbness and tingling was felt last night in both hands and fingers. Worked on neck flexion and core engagement to bring patient closer to allostatsis and out of sensory overload. Completed STM through supine position. Pt will see physician to rule out metabolic syndrome. BARBARA ATC    9/29  A: Right shoulder labrum pathology  S: Pt was completing push press over head. She felt herself get weak and was unable to hold the weight. Her Right shoulder gave out and she had her  catch the weight behind her. She had been telling the  that she was experiencing an anxiety attack. She was concerned because he did not seem to understand and ignored her. O: MDLS pain  YBT 74/53 Left inferiomedial  P: Completed unload for back and shoulder freeing exercises.   BARBARA ATC

## 2023-10-02 ENCOUNTER — OFFICE VISIT (OUTPATIENT)
Dept: OBGYN CLINIC | Facility: CLINIC | Age: 18
End: 2023-10-02
Payer: COMMERCIAL

## 2023-10-02 DIAGNOSIS — F39 MOOD DISORDER (HCC): ICD-10-CM

## 2023-10-02 DIAGNOSIS — R53.83 OTHER FATIGUE: ICD-10-CM

## 2023-10-02 DIAGNOSIS — S49.92XA INJURY OF LEFT SHOULDER, INITIAL ENCOUNTER: Primary | ICD-10-CM

## 2023-10-02 DIAGNOSIS — M25.512 ACUTE PAIN OF LEFT SHOULDER: ICD-10-CM

## 2023-10-02 PROCEDURE — 99205 OFFICE O/P NEW HI 60 MIN: CPT | Performed by: STUDENT IN AN ORGANIZED HEALTH CARE EDUCATION/TRAINING PROGRAM

## 2023-10-02 RX ORDER — NAPROXEN 500 MG/1
500 TABLET ORAL 2 TIMES DAILY WITH MEALS
Qty: 14 TABLET | Refills: 0 | Status: SHIPPED | OUTPATIENT
Start: 2023-10-02

## 2023-10-02 NOTE — PROGRESS NOTES
1. Injury of left shoulder, initial encounter  naproxen (Naprosyn) 500 mg tablet      2. Acute pain of left shoulder  naproxen (Naprosyn) 500 mg tablet      3. Mood disorder (HCC)  CBC and differential    Comprehensive metabolic panel    TSH, 3rd generation with Free T4 reflex    TIBC Panel (incl. Iron, TIBC, % Iron Saturation)    hCG, quantitative      4. Other fatigue  CBC and differential    Comprehensive metabolic panel    TSH, 3rd generation with Free T4 reflex    TIBC Panel (incl. Iron, TIBC, % Iron Saturation)    hCG, quantitative        Orders Placed This Encounter   Procedures   • CBC and differential   • Comprehensive metabolic panel   • TSH, 3rd generation with Free T4 reflex   • TIBC Panel (incl. Iron, TIBC, % Iron Saturation)   • hCG, quantitative        Imaging Studies (I personally reviewed images in PACS and report):    • No recent relevant imaging    IMPRESSION:  • Acute on chronic left shoulder pain- reportedly had a new injury while doing  • Clinical concern for depression/anxiety versus relative energy deficiency syndrome -currently, patient notes normal menstruation cycle monthly   Date of Injury: 9/29/2023  School: [employer]  Sport: Track and field    PLAN:    • Clinical exam and radiographic imaging reviewed with patient today, with impression as per above. I have discussed with the patient the pathophysiology of this diagnosis and reviewed how the examination correlates with this diagnosis. • In regards to her left shoulder pain/injury: I recommended conservative treatment at this time. There is some difficulty with interpretation of her exam given she reports already history of "chronic pain" and all testing elicits pain on exam as well. For now I recommended continue rehab with her athletic training team and restriction from sports at this time.   I have prescribed her naproxen 500 mg p.o. twice daily consistently for 1 week and counseled not to concurrently take other NSAIDs with this medication but could take acetaminophen along with heat/ice therapy 20 minutes on/off, TENS machine use as needed. We will consider further advanced imaging based on reevaluation in a week  • Separately, in regards to her mood disorder, I am concerned for moderate depression and this may be a factor in potential relative energy deficiency syndrome. I have ordered some baseline blood work at this time. I did strongly recommend patient reach out to her primary care provider at her school to discuss potentially starting an antidepressant medication. • Case reviewed with her athletic training team, specifically Misa Hooker. Return in about 1 week (around 10/9/2023). Portions of the record may have been created with voice recognition software. Occasional wrong word or "sound a like" substitutions may have occurred due to the inherent limitations of voice recognition software. Read the chart carefully and recognize, using context, where substitutions have occurred. I have spent a total time of 60 minutes on 10/03/23 in caring for this patient including Diagnostic results, Prognosis, Risks and benefits of tx options, Instructions for management, Patient and family education, Importance of tx compliance, Risk factor reductions, Impressions, Counseling / Coordination of care, Documenting in the medical record, Reviewing / ordering tests, medicine, procedures  , Obtaining or reviewing history   and Communicating with other healthcare professionals .         CHIEF COMPLAINT:  Bilateral shoulder pain, left greater than right  Depression/anxiety    HPI:  Meghan Moore is a 19 y.o. female  who presents for       Visit 10/2/2023:  Initial evaluation of bilateral shoulder pain/injury: Left worse than right  Of note, she is an [employer] who participates in track and field  Of note, patient reports she has a history of "chronic pain" in general throughout her bodies and joints for years. She reports paresthesias of her upper extremities at times as well that can be exacerbated randomly with no specific pattern. She states has been ongoing issue for years while participating in sports. She states there is an exacerbation of her left shoulder pain while doing press and cleans on 929/930. She states she thinks she felt a popping sensation of her left shoulder. Since this incident she feels it has been more pain than baseline than usual over the anterior/lateral/posterior and dorsal aspect of her shoulder. She states she can have some tingling sensation that radiates down to her hand that well at times but this is a chronic issue in general.  She denies any shoulder swelling, discoloration. She feels that her shoulder range of motion and strength are intact but limited due to the pain. She reports crepitus of her shoulders. She has been doing rehab with her athletic training team briefly. She has not taken any pain medications. Denies prior history of left shoulder surgeries. Separately, I am concerned about patient's overall mood/anxiety. Patient states for several years she has experienced what she thought was depression or anxiety. She states she has seen providers in the past and has never placed on any medication for her mood symptoms. She has seen counseling as well in the past but does not feel that it is provided much help with her overall mental wellbeing. She notes that she can have symptoms of suicidal ideation and years ago experience making a plan for suicide but no longer has any plans for suicide. She only feels that she has the ideation that she would be better off dead. Denies any homicidal ideation. She has other ROS as per PHQ screening below. She describes her appetite as "poor" and does not eat much in general.  She states she only eats more to reach "caloric demands" when she is actively participating in her sport.   In regards to menstruation, she states over the past year they have been occurring monthly and they last about 4 days. She states few years ago, she was missing her period for about 3 months but this was the most time she has ever missed. She denies being pregnant but has not been tested. PHQ-2/9 Depression Screening    Little interest or pleasure in doing things: 3 - nearly every day  Feeling down, depressed, or hopeless: 3 - nearly every day  Trouble falling or staying asleep, or sleeping too much: 2 - more than half the days  Feeling tired or having little energy: 2 - more than half the days  Poor appetite or overeating: 3 - nearly every day  Feeling bad about yourself - or that you are a failure or have let yourself or your family down: 2 - more than half the days  Trouble concentrating on things, such as reading the newspaper or watching television: 2 - more than half the days  Moving or speaking so slowly that other people could have noticed. Or the opposite - being so fidgety or restless that you have been moving around a lot more than usual: 0 - not at all  Thoughts that you would be better off dead, or of hurting yourself in some way: 1 - several days  PHQ-2 Score: 6  PHQ-2 Interpretation: POSITIVE depression screen  PHQ-9 Score: 18   PHQ-9 Interpretation: Moderately severe depression              Medical, Surgical, Family, and Social History    History reviewed. No pertinent past medical history. History reviewed. No pertinent surgical history. Social History   Social History     Substance and Sexual Activity   Alcohol Use None     Social History     Substance and Sexual Activity   Drug Use Not on file     Social History     Tobacco Use   Smoking Status Not on file   Smokeless Tobacco Not on file     History reviewed. No pertinent family history. Not on File       Physical Exam  There were no vitals taken for this visit.     Constitutional:  see vital signs  Gen: well-developed, normocephalic/atraumatic, well-groomed  Pulmonary/Chest: Effort normal. No respiratory distress.    NEURO: cranial nerves grossly intact  PSYCH:  Alert and oriented to person, place, and time; recent and remote memory intact; mood depressed/anxious; patient is tearful towards the end of the visit as she discusses her overall mood symptoms that she has been experiencing    Ortho Exam  Shoulder exam:       Bilateral shoulder    Inspection Erythema None     Swelling None     Increased Warmth None    Rotator cuff (all resisted motion aggravates shoulder pain, left greater than right) ER 5/5 bilaterally     IR 5/5 bilaterally     Abduction 5/5 bilaterally    ROM      Abduction 170     ER0 60     ER90 90     IR90 40     IRb T7    TTP:  + Diffusely around her shoulder, left paracervical    Special Tests: O'Briens  (FF 90, add 10, resist thumbs up-, resist thumbs down+) Positive bilaterally  however, this exam is limited as tested this with internal range of motion and external rotation of motion severely aggravate left shoulder pain    Cross body Adduction Positive     Speeds  Positive     Yergason's Positive     Drop arm Positive     Neer Positive     Downs Positive        UE NV Exam: +2 Radial pulses bilaterally  Sensation intact to light touch C5-T1 bilaterally  Ok sign (median nerve): intact  Froment sign (ulnar nerve): intact  Thumb extension (radial nerve): 5/5 and intact      Bilateral elbow, wrist, and and forearm ROM full    Cervical ROM is full with reported paracervical and midline neck pain; does not exacerbate N/T of upper extremity            Procedures

## 2023-10-04 ENCOUNTER — ATHLETIC TRAINING (OUTPATIENT)
Dept: SPORTS MEDICINE | Facility: OTHER | Age: 18
End: 2023-10-04

## 2023-10-04 DIAGNOSIS — S43.431A SUPERIOR GLENOID LABRUM LESION OF RIGHT SHOULDER, INITIAL ENCOUNTER: Primary | ICD-10-CM

## 2023-10-05 NOTE — PROGRESS NOTES
10/4  Completed exercises and released trap tightness. LB ATC    9/30  Numbness and tingling was felt last night in both hands and fingers. Worked on neck flexion and core engagement to bring patient closer to allostatsis and out of sensory overload. Completed STM through supine position. Pt will see physician to rule out metabolic syndrome. LB ATC    9/29  A: Right shoulder labrum pathology  S: Pt was completing push press over head. She felt herself get weak and was unable to hold the weight. Her Right shoulder gave out and she had her  catch the weight behind her. She had been telling the  that she was experiencing an anxiety attack. She was concerned because he did not seem to understand and ignored her. O: MDLS pain  YBT 33/10 Left inferiomedial  P: Completed unload for back and shoulder freeing exercises.    ATC

## 2023-10-09 ENCOUNTER — OFFICE VISIT (OUTPATIENT)
Dept: OBGYN CLINIC | Facility: CLINIC | Age: 18
End: 2023-10-09
Payer: COMMERCIAL

## 2023-10-09 VITALS
DIASTOLIC BLOOD PRESSURE: 80 MMHG | SYSTOLIC BLOOD PRESSURE: 126 MMHG | HEART RATE: 80 BPM | WEIGHT: 133 LBS | BODY MASS INDEX: 22.16 KG/M2 | HEIGHT: 65 IN

## 2023-10-09 DIAGNOSIS — M54.2 CHRONIC NECK PAIN: ICD-10-CM

## 2023-10-09 DIAGNOSIS — G89.29 CHRONIC RIGHT SHOULDER PAIN: ICD-10-CM

## 2023-10-09 DIAGNOSIS — G89.29 CHRONIC NECK PAIN: ICD-10-CM

## 2023-10-09 DIAGNOSIS — M25.811 CLICKING RIGHT SHOULDER: ICD-10-CM

## 2023-10-09 DIAGNOSIS — M25.812 CLICKING LEFT SHOULDER: ICD-10-CM

## 2023-10-09 DIAGNOSIS — M25.512 ACUTE PAIN OF LEFT SHOULDER: Primary | ICD-10-CM

## 2023-10-09 DIAGNOSIS — S49.92XA INJURY OF LEFT SHOULDER, INITIAL ENCOUNTER: ICD-10-CM

## 2023-10-09 DIAGNOSIS — M25.511 CHRONIC RIGHT SHOULDER PAIN: ICD-10-CM

## 2023-10-09 PROCEDURE — 99214 OFFICE O/P EST MOD 30 MIN: CPT | Performed by: STUDENT IN AN ORGANIZED HEALTH CARE EDUCATION/TRAINING PROGRAM

## 2023-10-09 NOTE — PROGRESS NOTES
1. Acute pain of left shoulder  MRI arthrogram left shoulder    FL injection left shoulder (arthrogram)    XR shoulder 2+ vw left      2. Injury of left shoulder, initial encounter  MRI arthrogram left shoulder    FL injection left shoulder (arthrogram)    XR shoulder 2+ vw left      3. Clicking left shoulder  MRI arthrogram left shoulder    FL injection left shoulder (arthrogram)    XR shoulder 2+ vw left      4. Chronic right shoulder pain  XR shoulder 2+ vw right      5. Chronic neck pain  XR spine cervical 2 or 3 vw injury      6. Clicking right shoulder  XR shoulder 2+ vw right        Orders Placed This Encounter   Procedures   • MRI arthrogram left shoulder   • FL injection left shoulder (arthrogram)   • XR spine cervical 2 or 3 vw injury   • XR shoulder 2+ vw right   • XR shoulder 2+ vw left        Imaging Studies (I personally reviewed images in PACS and report):    • No recent relevant imaging    IMPRESSION:  • Acute on chronic left shoulder pain- reportedly had a new injury while doing shoulder pressing/shoulder exercises. Differential includes labral tear versus rotator cuff tendinosis/tendinitis  • Chronic right shoulder pain-further exacerbated from compensation for left shoulder injury  • Chronic midline paracervical neck pain-further exacerbated from compensation of left shoulder injury  • Clinical concern for depression/anxiety versus relative energy deficiency syndrome -currently, patient notes normal menstruation cycle monthly   Date of Injury: 9/29/2023  School: [employer]  Sport: Track and field    Other factors:  Unspecified mood disorder/bipolar disorder- ongoing issue for years and has currently been referred to psychiatry. I do feel a portion of her ongoing chronic pain and paresthesias are contributory to this issue as well. PLAN:    • Clinical exam and radiographic imaging reviewed with patient today, with impression as per above.  I have discussed with the patient the pathophysiology of this diagnosis and reviewed how the examination correlates with this diagnosis. • In regards to her mood related symptoms, there is possible concern for bipolar disorder and her PCP did refer her to psychiatry going forward. • I did review recent lab work with patient today which was unremarkable. I will have the forms scanned in to her chart at a later date as well as I do not have the capabilities in the office today. • Given patient has unremarkable lab work, normal timeframe of menses, I do not feel she has ongoing relative energy deficiency and more so has been undergoing symptoms consistent with a mental health disorder that may manifest itself with her complaints of generalized chronic neck and shoulder pain for years along with paresthesias of her upper extremities. I do feel that some degree of her pain and paresthesias may be contributory towards her mental health as well. • However, given there was a precipitating injury that exacerbated her baseline of pain of her left shoulder, as well as clinical exam findings that could be consistent with a labral tear-I have ordered radiographic imaging of her left shoulder as well as an MRI arthrogram of her left shoulder going forward for further evaluation. • Given her history of chronic neck and right shoulder pain as well I have ordered radiographic imaging of these aspects of pain going forward. I will consider further advanced imaging in the future. • In regards to participation in sports, I counseled she can attempt to participate progressively as tolerated. Given she has significant amount of pain of her shoulders however, I did advise that we can consider holding her off from sports for the next 2 weeks and progressing forward as tolerated. Patient agreeable to this plan. • In regards to pain control, patient did not feel much relief from the NSAIDs or acetaminophen.   She actually did feel some degree of pain relief from electrostimulation/TENS machine use which she can continue going forward. I also discussed other ways of obtaining a TENS unit for home as well. • Case reviewed with her athletic training team.    No follow-ups on file. Portions of the record may have been created with voice recognition software. Occasional wrong word or "sound a like" substitutions may have occurred due to the inherent limitations of voice recognition software. Read the chart carefully and recognize, using context, where substitutions have occurred. CHIEF COMPLAINT:  Bilateral shoulder pain, left greater than right follow-up      HPI:  Omar Gifford is a 19 y.o. female  who presents for     Visit 10/9/2023: Follow-up evaluation of bilateral shoulder pain, left worse than right with a precipitating injury while doing shoulder exercises/shoulder lifting exercises    Of note, patient does have a reported history of chronic neck and bilateral shoulder pain for several years. Reports crepitus/clicking/popping of her shoulders as well. He also reports chronic bilateral hand paresthesias intermittent episodes of dropping objects. Patient reports no difference in regards to the pain of her shoulders and neck since last visit. She reports clicking/popping of her shoulders bilaterally. She does report some improvement in regards to her shoulder range of motion since seeing her athletic trainers for therapy. She feels that her strength is intact of her arms despite pain being exacerbated with any lifting/pushing/pulling motions. She describes obtained as burning/tight/sharp and can be occurring over the anterior/lateral/posterior aspects of her shoulders and can radiate up the her neck. She feels the electric stimulation machine helps reduce some degree of the burning pain from her neck and paracervical shoulders. She reports no relief from the oral naproxen prescribed previously.     Separately, in regards to her mental health, she did see her PCP for this issue and she was referred to psychiatry going forward as there is potential concern for bipolar disorder. Patient states that her sibling also has a history of bipolar disorder as well. Medical, Surgical, Family, and Social History    No past medical history on file. No past surgical history on file. Social History   Social History     Substance and Sexual Activity   Alcohol Use Not on file     Social History     Substance and Sexual Activity   Drug Use Not on file     Social History     Tobacco Use   Smoking Status Not on file   Smokeless Tobacco Not on file     No family history on file. Not on File       Physical Exam  /80   Pulse 80   Ht 5' 5" (1.651 m)   Wt 60.3 kg (133 lb)   BMI 22.13 kg/m²     Constitutional:  see vital signs  Gen: well-developed, normocephalic/atraumatic, well-groomed  Pulmonary/Chest: Effort normal. No respiratory distress.    NEURO: cranial nerves grossly intact  PSYCH:  Alert and oriented to person, place, and time; recent and remote memory intact; mood depressed/anxious; patient is tearful towards the end of the visit as she discusses her overall mood symptoms that she has been experiencing    Ortho Exam  Shoulder exam:       RIGHT LEFT    Inspection Erythema None None     Swelling None None     Increased Warmth None None    Rotator cuff (intact and aggravates anterior and posterior shoulder pain bilaterally, left greater than right) ER 5/5 5/5     IR 5/5 5/5     Abduction 5/5 5/5    ROM (all range of motion movements aggravate shoulder and neck pain)  170     Abduction 170 170     ER0 60 60     IRb T7 T7    TTP:  + Anterior aspect of the glenohumeral joint line, lateral aspect of the greater tuberosity, general posterior aspect of her shoulder + Anterior glenohumeral joint line, lateral aspect over the greater tuberosity, posterior aspect/supraspinatus, infraspinatus    Special Tests:  O'Briens  (FF 90, add 10, resist thumbs up-, resist thumbs down+) equivocal Positive slap    Crank  (Abd 90, axial load, rotate) Negative Positive slap    Cross body Adduction Negative Negative     Speeds  Positive Positive     Yergason's Positive Positive     Drop arm Negative Negative     Neer Positive Positive     Downs Positive Positive        Cervical ROM is full with reported midline paracervical pain; denies exacerbation of upper extremity numbness or tingling.                   Procedures

## 2023-10-14 ENCOUNTER — APPOINTMENT (OUTPATIENT)
Age: 18
End: 2023-10-14
Payer: COMMERCIAL

## 2023-10-14 DIAGNOSIS — G89.29 CHRONIC NECK PAIN: ICD-10-CM

## 2023-10-14 DIAGNOSIS — M25.812 CLICKING LEFT SHOULDER: ICD-10-CM

## 2023-10-14 DIAGNOSIS — M25.512 ACUTE PAIN OF LEFT SHOULDER: ICD-10-CM

## 2023-10-14 DIAGNOSIS — G89.29 CHRONIC RIGHT SHOULDER PAIN: ICD-10-CM

## 2023-10-14 DIAGNOSIS — M25.511 CHRONIC RIGHT SHOULDER PAIN: ICD-10-CM

## 2023-10-14 DIAGNOSIS — M54.2 CHRONIC NECK PAIN: ICD-10-CM

## 2023-10-14 DIAGNOSIS — S49.92XA INJURY OF LEFT SHOULDER, INITIAL ENCOUNTER: ICD-10-CM

## 2023-10-14 DIAGNOSIS — M25.811 CLICKING RIGHT SHOULDER: ICD-10-CM

## 2023-10-14 PROCEDURE — 73030 X-RAY EXAM OF SHOULDER: CPT

## 2023-10-14 PROCEDURE — 72040 X-RAY EXAM NECK SPINE 2-3 VW: CPT

## 2023-10-16 DIAGNOSIS — M25.512 ACUTE PAIN OF LEFT SHOULDER: ICD-10-CM

## 2023-10-16 DIAGNOSIS — S49.92XA INJURY OF LEFT SHOULDER, INITIAL ENCOUNTER: ICD-10-CM

## 2023-10-16 RX ORDER — NAPROXEN 500 MG/1
500 TABLET ORAL 2 TIMES DAILY PRN
Qty: 40 TABLET | Refills: 1 | Status: SHIPPED | OUTPATIENT
Start: 2023-10-16

## 2023-10-18 ENCOUNTER — TELEPHONE (OUTPATIENT)
Dept: OBGYN CLINIC | Facility: CLINIC | Age: 18
End: 2023-10-18

## 2023-10-20 ENCOUNTER — ATHLETIC TRAINING (OUTPATIENT)
Dept: SPORTS MEDICINE | Facility: OTHER | Age: 18
End: 2023-10-20

## 2023-10-20 DIAGNOSIS — M25.511 CHRONIC PAIN OF BOTH SHOULDERS: Primary | ICD-10-CM

## 2023-10-20 DIAGNOSIS — M25.512 CHRONIC PAIN OF BOTH SHOULDERS: Primary | ICD-10-CM

## 2023-10-20 DIAGNOSIS — G89.29 CHRONIC PAIN OF BOTH SHOULDERS: Primary | ICD-10-CM

## 2023-10-20 NOTE — PROGRESS NOTES
AT Treatment                   Subjective: Erick reports intermittent radicular symptoms that are exacerbated with stress and activity. She has a hard time sleeping due to the radicular symptoms. She was able to find some comfort utilizing heat and cervical mobs/relaxation techniques. Objective: See treatment diary below      Assessment: Tolerated treatment fair. Patient would benefit from continued AT      Plan: Continue per plan of care.       HP x 10 min,   Upper trap deactivation  Cervical mobilization/relaxation

## 2023-10-23 ENCOUNTER — ATHLETIC TRAINING (OUTPATIENT)
Dept: SPORTS MEDICINE | Facility: OTHER | Age: 18
End: 2023-10-23

## 2023-10-23 DIAGNOSIS — M25.511 CHRONIC PAIN OF BOTH SHOULDERS: Primary | ICD-10-CM

## 2023-10-23 DIAGNOSIS — M25.512 CHRONIC PAIN OF BOTH SHOULDERS: Primary | ICD-10-CM

## 2023-10-23 DIAGNOSIS — G89.29 CHRONIC PAIN OF BOTH SHOULDERS: Primary | ICD-10-CM

## 2023-10-23 NOTE — PROGRESS NOTES
AT Treatment                   Subjective: Erick reports no increase in s/s. She is in good spirits. Tomi Cardona recognizes an increase in s/s with an increase in stress      Objective: See treatment diary below      Assessment: Tolerated treatment fair. Patient would benefit from continued AT      Plan: Continue per plan of care.       HP x 10 min,   Upper trap deactivation  Cervical mobilization/relaxation

## 2023-10-27 ENCOUNTER — ATHLETIC TRAINING (OUTPATIENT)
Dept: SPORTS MEDICINE | Facility: OTHER | Age: 18
End: 2023-10-27

## 2023-10-27 DIAGNOSIS — M25.511 CHRONIC PAIN OF BOTH SHOULDERS: Primary | ICD-10-CM

## 2023-10-27 DIAGNOSIS — M25.512 CHRONIC PAIN OF BOTH SHOULDERS: Primary | ICD-10-CM

## 2023-10-27 DIAGNOSIS — G89.29 CHRONIC PAIN OF BOTH SHOULDERS: Primary | ICD-10-CM

## 2023-10-27 NOTE — PROGRESS NOTES
Athletic Training Progress Note    Name: Artist Yeison  Age: 19 y.o. Assessment/Plan:     Visit Diagnosis: Chronic pain of both shoulders [M25.511, G89.29, M25.512]    Treatment Plan: MRI L shoulder 11/1. General conditioning, pain control. Initiate shoulder ROM & strengthening per MRI results    []  Follow-up PRN. []  Follow-up prior to next practice/game for re-evaluation. [x]  Daily treatment/rehab. Progress note expected weekly. Subjective: Erick reports decreasing radicular symptoms. There is no change in her shoulder popping.        Objective:   See treatment log below    Treatment Log:     Date: 10/27/23       Playing Status:                Exercise/Treatment        Bike 15 min       HP 10       MMT Cervical

## 2023-10-31 ENCOUNTER — ATHLETIC TRAINING (OUTPATIENT)
Dept: SPORTS MEDICINE | Facility: OTHER | Age: 18
End: 2023-10-31

## 2023-10-31 ENCOUNTER — TELEPHONE (OUTPATIENT)
Dept: RADIOLOGY | Facility: HOSPITAL | Age: 18
End: 2023-10-31

## 2023-10-31 DIAGNOSIS — G89.29 CHRONIC PAIN OF BOTH SHOULDERS: Primary | ICD-10-CM

## 2023-10-31 DIAGNOSIS — M25.512 CHRONIC PAIN OF BOTH SHOULDERS: Primary | ICD-10-CM

## 2023-10-31 DIAGNOSIS — M25.511 CHRONIC PAIN OF BOTH SHOULDERS: Primary | ICD-10-CM

## 2023-10-31 NOTE — PROGRESS NOTES
Athletic Training Progress Note    Name: Aj Garcia  Age: 19 y.o. Assessment/Plan:     Visit Diagnosis: Chronic pain of both shoulders [M25.511, G89.29, M25.512]    Treatment Plan: Continue pain control. []  Follow-up PRN. []  Follow-up prior to next practice/game for re-evaluation. [x]  Daily treatment/rehab. Progress note expected weekly. Subjective: Erick reports continued radicular symptoms in her upper traps, arm/hands and occasional side of face.     Objective:   See treatment log below    Treatment Log:     Date:        Playing Status:                Exercise/Treatment        Bike X 15 min       HP X 10 min       MMT Cervical                                                                          Date: 10/27/23       Playing Status:                Exercise/Treatment        Bike 15 min       HP 10       MMT Cervical

## 2023-10-31 NOTE — NURSING NOTE
Call placed to pt to discuss upcoming appointment at [address] [place] [address] Radiology Department and consultation completed. Pt is having a L Shoulder Arthrogram completed on 11/1/2023. Allergies reviewed and verified pt does not currently take any anticoagulant medications. Pre procedure instructions including diet and taking own medications discussed with pt. Pt instructed that she may eat normally and take medications as usual before the procedure. Pt verbalized understanding of instructions given. Reminded pt of the location, date and time of procedure. My number was given to call if any questions or concerns arise pre or post procedure.

## 2023-11-01 ENCOUNTER — HOSPITAL ENCOUNTER (OUTPATIENT)
Dept: RADIOLOGY | Facility: HOSPITAL | Age: 18
Discharge: HOME | End: 2023-11-01
Payer: COMMERCIAL

## 2023-11-01 ENCOUNTER — HOSPITAL ENCOUNTER (OUTPATIENT)
Dept: MRI IMAGING | Facility: HOSPITAL | Age: 18
Discharge: HOME | End: 2023-11-01
Payer: COMMERCIAL

## 2023-11-01 DIAGNOSIS — S49.92XA INJURY OF LEFT SHOULDER, INITIAL ENCOUNTER: ICD-10-CM

## 2023-11-01 DIAGNOSIS — M25.812 CLICKING LEFT SHOULDER: ICD-10-CM

## 2023-11-01 DIAGNOSIS — M25.512 ACUTE PAIN OF LEFT SHOULDER: ICD-10-CM

## 2023-11-01 PROCEDURE — A9585 GADOBUTROL INJECTION: HCPCS | Performed by: STUDENT IN AN ORGANIZED HEALTH CARE EDUCATION/TRAINING PROGRAM

## 2023-11-01 PROCEDURE — 23350 INJECTION FOR SHOULDER X-RAY: CPT

## 2023-11-01 PROCEDURE — 73222 MRI JOINT UPR EXTREM W/DYE: CPT

## 2023-11-01 PROCEDURE — G1004 CDSM NDSC: HCPCS

## 2023-11-01 PROCEDURE — 77002 NEEDLE LOCALIZATION BY XRAY: CPT

## 2023-11-01 RX ORDER — GADOBUTROL 604.72 MG/ML
12 INJECTION INTRAVENOUS
Status: COMPLETED | OUTPATIENT
Start: 2023-11-01 | End: 2023-11-01

## 2023-11-01 RX ORDER — LIDOCAINE HYDROCHLORIDE 10 MG/ML
4 INJECTION, SOLUTION EPIDURAL; INFILTRATION; INTRACAUDAL; PERINEURAL
Status: DISCONTINUED | OUTPATIENT
Start: 2023-11-01 | End: 2023-11-02 | Stop reason: HOSPADM

## 2023-11-01 RX ADMIN — IOHEXOL 1 ML: 300 INJECTION, SOLUTION INTRAVENOUS at 14:05

## 2023-11-01 RX ADMIN — GADOBUTROL 12 ML: 604.72 INJECTION INTRAVENOUS at 14:05

## 2023-11-02 ENCOUNTER — ATHLETIC TRAINING (OUTPATIENT)
Dept: SPORTS MEDICINE | Facility: OTHER | Age: 18
End: 2023-11-02

## 2023-11-02 DIAGNOSIS — M25.511 CHRONIC PAIN OF BOTH SHOULDERS: Primary | ICD-10-CM

## 2023-11-02 DIAGNOSIS — M25.512 CHRONIC PAIN OF BOTH SHOULDERS: Primary | ICD-10-CM

## 2023-11-02 DIAGNOSIS — G89.29 CHRONIC PAIN OF BOTH SHOULDERS: Primary | ICD-10-CM

## 2023-11-02 NOTE — PROGRESS NOTES
Athletic Training Progress Note    Name: Anthony Quiles  Age: 19 y.o. Assessment/Plan:     Visit Diagnosis: Chronic pain of both shoulders [M25.511, G89.29, M25.512]    Treatment Plan: Continue STM & pain control treatments    []  Follow-up PRN. []  Follow-up prior to next practice/game for re-evaluation. []  Daily treatment/rehab. Progress note expected weekly. Subjective: Erick reports continued radicular pain in her shoulders, arms/hands, upper traps, neck, and face.  Was able to achieve minimal radiculopathy and pain during positional release    Objective:   See treatment log below    Treatment Log:     Date: 11-2-23       Playing Status:                Exercise/Treatment        HP  10 min       Bike 15 min       MMT Cervical                                                                          Date:        Playing Status:                Exercise/Treatment        Bike X 15 min       HP X 10 min       MMT Cervical                                                                          Date: 10/27/23       Playing Status:                Exercise/Treatment        Bike 15 min       HP 10       MMT Cervical

## 2023-11-08 ENCOUNTER — ATHLETIC TRAINING (OUTPATIENT)
Dept: SPORTS MEDICINE | Facility: OTHER | Age: 18
End: 2023-11-08

## 2023-11-08 DIAGNOSIS — M25.511 CHRONIC PAIN OF BOTH SHOULDERS: Primary | ICD-10-CM

## 2023-11-08 DIAGNOSIS — G89.29 CHRONIC PAIN OF BOTH SHOULDERS: Primary | ICD-10-CM

## 2023-11-08 DIAGNOSIS — M25.512 CHRONIC PAIN OF BOTH SHOULDERS: Primary | ICD-10-CM

## 2023-11-08 NOTE — PROGRESS NOTES
Athletic Training Progress Note    Name: Susan Webster  Age: 19 y.o. Assessment/Plan:     Visit Diagnosis: Chronic pain of both shoulders [M25.511, G89.29, M25.512]    Treatment Plan: continue decreasing pain in upper traps. Increase conditioning and strength as tolerated. Increase scapular kinesis     []  Follow-up PRN. []  Follow-up prior to next practice/game for re-evaluation. [x]  Daily treatment/rehab. Progress note expected weekly. Subjective: Erick reports continued radiculopathy. She has increasing discomfort wearing her book bag during the past week.     Objective:   See treatment log below    Treatment Log:     Date: 11-8-23       Playing Status:                Exercise/Treatment        bike 15 min       Prone scapular squeezes 5x5       Modified prone pivot x25       MMT Cervical                                                                  Date: 11-2-23       Playing Status:                Exercise/Treatment        HP  10 min       Bike 15 min       MMT Cervical                                                                          Date:        Playing Status:                Exercise/Treatment        Bike X 15 min       HP X 10 min       MMT Cervical                                                                          Date: 10/27/23       Playing Status:                Exercise/Treatment        Bike 15 min       HP 10       MMT Cervical

## 2023-11-13 ENCOUNTER — OFFICE VISIT (OUTPATIENT)
Dept: OBGYN CLINIC | Facility: CLINIC | Age: 18
End: 2023-11-13
Payer: COMMERCIAL

## 2023-11-13 DIAGNOSIS — M25.511 CHRONIC PAIN OF BOTH SHOULDERS: ICD-10-CM

## 2023-11-13 DIAGNOSIS — S49.92XD INJURY OF LEFT SHOULDER, SUBSEQUENT ENCOUNTER: ICD-10-CM

## 2023-11-13 DIAGNOSIS — M75.52 BURSITIS OF LEFT SHOULDER: ICD-10-CM

## 2023-11-13 DIAGNOSIS — R20.2 NUMBNESS AND TINGLING OF BOTH UPPER EXTREMITIES: ICD-10-CM

## 2023-11-13 DIAGNOSIS — R20.0 NUMBNESS AND TINGLING OF BOTH UPPER EXTREMITIES: ICD-10-CM

## 2023-11-13 DIAGNOSIS — G89.29 CHRONIC PAIN OF BOTH SHOULDERS: ICD-10-CM

## 2023-11-13 DIAGNOSIS — G89.29 CHRONIC NECK PAIN: Primary | ICD-10-CM

## 2023-11-13 DIAGNOSIS — M25.512 CHRONIC PAIN OF BOTH SHOULDERS: ICD-10-CM

## 2023-11-13 DIAGNOSIS — M54.2 CHRONIC NECK PAIN: Primary | ICD-10-CM

## 2023-11-13 DIAGNOSIS — M25.812 CLICKING LEFT SHOULDER: ICD-10-CM

## 2023-11-13 PROCEDURE — 99214 OFFICE O/P EST MOD 30 MIN: CPT | Performed by: STUDENT IN AN ORGANIZED HEALTH CARE EDUCATION/TRAINING PROGRAM

## 2023-11-13 NOTE — PROGRESS NOTES
1. Chronic neck pain  MRI cervical spine wo contrast      2. Numbness and tingling of both upper extremities  MRI cervical spine wo contrast      3. Injury of left shoulder, subsequent encounter        4. Chronic pain of both shoulders        5. Clicking left shoulder        6. Bursitis of left shoulder          Orders Placed This Encounter   Procedures    MRI cervical spine wo contrast        Imaging Studies (I personally reviewed images in PACS and report):    MRI arthrogram left shoulder 11/1/2023: No rotator cuff tear discrete labral tear. Trace loculated fluid noted within the subacromial/subpatellar bursa      IMPRESSION:  Chronic left shoulder pain- reportedly had an injury while doing shoulder pressing/shoulder exercises. Chronic right shoulder pain  Chronic midline paracervical neck pain-further exacerbated from compensation of left shoulder injury  Reports radicular pain/paresthesias of upper extremities in no specific pattern. (Ddx: secondary to paracervical myofascial tightness from stress, thoracic outlet disorder, cervical herniated disc, cubital tunnel syndrome) - paresthesias and shooting pain aggravated with neck ROM per patient, but also endorses numbness with tinel's test of left elbow  Unspecified mood disorder - possibly bipolar disorder and likely a contributing factor to her chronic pain symptoms. Currently established with her PCP/CBT   Date of Injury: 9/29/2023  School: [employer]  Sport: Track and field    Other factors:  Unspecified mood disorder/bipolar disorder- ongoing issue for years and has currently been referred to psychiatry. I do feel a portion of her ongoing chronic pain and paresthesias are contributory to this issue as well. PLAN:    Clinical exam and radiographic imaging reviewed with patient today, with impression as per above.  I have discussed with the patient the pathophysiology of this diagnosis and reviewed how the examination correlates with this diagnosis. In regards to her mood related symptoms, there is possible concern for bipolar disorder and her PCP did refer her to psychiatry going forward. MRI arthrogram of her left shoulder reviewed today as noted above. I reassured her that there were no findings that would warrant surgical intervention. I counseled the trace swelling of her bursa could be secondary to bursitis as a contributing factor to lateral shoulder pain but would not explain the diffuse amount of pain she is experiencing from her neck down to her hand. I counseled that bursitis is typically treated with rehab, NSAIDs, acetaminophen, heat/ice therapy over time. I counseled there could be consideration for a diagnostic and possibly therapeutic subacromial cortisone injection but patient deferred this option. Given the unremarkable findings of her left shoulder MRI, I counseled that there could be a radicular aspect from her cervical spine causing her symptoms given she reports range of motion with her neck seem to exacerbate the pain of her neck and upper extremity shooting pain/paresthesias as well. Patient was agreeable to obtain an MRI of her cervical spine to rule out potential herniated disks. Overall, I counseled the the MRI of her cervical spine is unremarkable, there is more likely consideration that her pain and paresthesias may be secondary to her mental health issues. She continues to see her PCP as well as CBT. She reports she has not been started on medication due to to concern of possible bipolar disorder. I counseled she should reach out to her PCP if she feels that CBT treatments are not progressively improving her symptoms over time. She does not feel that she can return back to her sport at this time and I will hold her from returning to continue working with her athletic training team in regards to rehab. Recommend patient take a break from oral NSAIDs at this time for at least a week.   Counseled use of Tylenol/heat/ice therapy, TENS machine units and stretching for now. Return for Follow up after MRI cervical spine. Depending on results will determine treatment plan going forward. I do have some consideration to refer her to spine and pain management for potential C-spine injections versus trigger point injections as well but patient prefers to hold off this referral until we can review the MRI. Portions of the record may have been created with voice recognition software. Occasional wrong word or "sound a like" substitutions may have occurred due to the inherent limitations of voice recognition software. Read the chart carefully and recognize, using context, where substitutions have occurred. CHIEF COMPLAINT:  Bilateral shoulder pain, left greater than right follow-up      HPI:  Omar Gifford is a 19 y.o. female  who presents for     Visit 1/13/2023: Follow-up evaluation of neck and bilateral shoulder pain, left greater than right  MRI arthrogram of the left shoulder was obtained since last visit and reviewed as noted above. Patient has been restricted from returning back to her sport at this time and working on rehab with her athletic training team.  She reports that despite the unremarkable findings of her MRI arthrogram other than trace fluid of the bursa, she continues to experience pain over her anterior/lateral/posterior shoulders that radiate up to her neck and sometimes down her arm into her hand. She does report improvement and that her shoulder is no longer click. She reports continued use of naproxen daily but is unsure whether it is providing much relief. She states that shooting pain of her upper extremities and paresthesias of her upper extremities seem to be aggravated with range of motion movements of her neck. She states the paresthesias can range from being over her pinky and ring digit to her index finger to the palmar and dorsal aspects of her hand.   She states there is no specific pattern of what aggravates these issues. Patient is frustrated due to the results of her MRI to determine source of her pain. She does understand that her mental health is a possible contributing factor but has not started any medication for this issue yet. She continues to see cognitive behavioral therapy as well as her PCP. Visit 10/9/2023: Follow-up evaluation of bilateral shoulder pain, left worse than right with a precipitating injury while doing shoulder exercises/shoulder lifting exercises    Of note, patient does have a reported history of chronic neck and bilateral shoulder pain for several years. Reports crepitus/clicking/popping of her shoulders as well. He also reports chronic bilateral hand paresthesias intermittent episodes of dropping objects. Patient reports no difference in regards to the pain of her shoulders and neck since last visit. She reports clicking/popping of her shoulders bilaterally. She does report some improvement in regards to her shoulder range of motion since seeing her athletic trainers for therapy. She feels that her strength is intact of her arms despite pain being exacerbated with any lifting/pushing/pulling motions. She describes obtained as burning/tight/sharp and can be occurring over the anterior/lateral/posterior aspects of her shoulders and can radiate up the her neck. She feels the electric stimulation machine helps reduce some degree of the burning pain from her neck and paracervical shoulders. She reports no relief from the oral naproxen prescribed previously. Separately, in regards to her mental health, she did see her PCP for this issue and she was referred to psychiatry going forward as there is potential concern for bipolar disorder. Patient states that her sibling also has a history of bipolar disorder as well. Medical, Surgical, Family, and Social History    History reviewed. No pertinent past medical history.   Past Surgical History:   Procedure Laterality Date    FL INJECTION LEFT SHOULDER (ARTHROGRAM)  11/1/2023     Social History   Social History     Substance and Sexual Activity   Alcohol Use None     Social History     Substance and Sexual Activity   Drug Use Not on file     Social History     Tobacco Use   Smoking Status Not on file   Smokeless Tobacco Not on file     History reviewed. No pertinent family history. No Known Allergies       Physical Exam  There were no vitals taken for this visit. Constitutional:  see vital signs  Gen: well-developed, normocephalic/atraumatic, well-groomed  Pulmonary/Chest: Effort normal. No respiratory distress. NEURO: cranial nerves grossly intact  PSYCH:  Alert and oriented to person, place, and time; recent and remote memory intact; mood depressed/anxious; patient is tearful towards the end of the visit as she discusses her overall mood symptoms that she has been experiencing    Ortho Exam  Cervical  ROM: intact in all planes of motion, but patient reports midline and paracervical neck pain with active neck flexion/extension/lateral bending. Reports shooting pain/numbness of her LUE during maneuvers.  Neck flexion 50 degrees, nexk extension 40 degrees, lateral flexion 45 degrees, rotation 80 degrees b/l  Midline spinous process tenderness: +diffusely along midline  Muscular Tenderness: +b/l paracervical  Sensation UE Bilateral:  C5: normal  C6: decreased on left compared to right  C7: decreased on left compared to right  C8: decreased on left compared to right  T1: normal  Strength UE: 5/5 elbow, wrist, fingers bilateral  Spurlings: negative b/l but aggravates neck pain    OK sign: intact b/l  Thumb extension test: intact b/l  Thumb abduction resistance testing: intact b/l    Tinel's test: + on left, negative on right                 Procedures

## 2023-11-14 ENCOUNTER — ATHLETIC TRAINING (OUTPATIENT)
Dept: SPORTS MEDICINE | Facility: OTHER | Age: 18
End: 2023-11-14

## 2023-11-14 DIAGNOSIS — M25.512 CHRONIC PAIN OF BOTH SHOULDERS: Primary | ICD-10-CM

## 2023-11-14 DIAGNOSIS — M25.511 CHRONIC PAIN OF BOTH SHOULDERS: Primary | ICD-10-CM

## 2023-11-14 DIAGNOSIS — G89.29 CHRONIC PAIN OF BOTH SHOULDERS: Primary | ICD-10-CM

## 2023-11-14 NOTE — PROGRESS NOTES
Athletic Training Progress Note    Name: Jeanette Carrasquillo  Age: 19 y.o. Assessment/Plan:     Visit Diagnosis: Chronic pain of both shoulders [M25.511, G89.29, M25.512]    Treatment Plan: Continue correcting scapular dyskinesis. Continue addressing pain patterns    []  Follow-up PRN. []  Follow-up prior to next practice/game for re-evaluation. [x]  Daily treatment/rehab. Progress note expected weekly. Subjective: Mylina reports continued radicular symptoms L > R neck,upper trap, arm. Objective:     She has tolerated increase in exercises well. Tolerated full Prone Pivot well.   See treatment log below    Treatment Log:     Date: 11/14/23       Playing Status:                Exercise/Treatment        Bike  3 min (L pain)       Shoulder Ext yellow band 5x5       Prone scapular squeezes 5x6       Prone pivots 2x5       MMT Cervical                                                          Date: 11-8-23       Playing Status:                Exercise/Treatment        bike 15 min       Prone scapular squeezes 5x5       Modified prone pivot x25       MMT Cervical                                                                  Date: 11-2-23       Playing Status:                Exercise/Treatment        HP  10 min       Bike 15 min       MMT Cervical                                                                          Date:        Playing Status:                Exercise/Treatment        Bike X 15 min       HP X 10 min       MMT Cervical                                                                          Date: 10/27/23       Playing Status:                Exercise/Treatment        Bike 15 min       HP 10       MMT Cervical

## 2023-11-16 ENCOUNTER — ATHLETIC TRAINING (OUTPATIENT)
Dept: SPORTS MEDICINE | Facility: OTHER | Age: 18
End: 2023-11-16

## 2023-11-16 DIAGNOSIS — M25.512 CHRONIC PAIN OF BOTH SHOULDERS: Primary | ICD-10-CM

## 2023-11-16 DIAGNOSIS — M25.511 CHRONIC PAIN OF BOTH SHOULDERS: Primary | ICD-10-CM

## 2023-11-16 DIAGNOSIS — G89.29 CHRONIC PAIN OF BOTH SHOULDERS: Primary | ICD-10-CM

## 2023-11-16 NOTE — PROGRESS NOTES
Athletic Training Progress Note    Name: Domi Hamilton  Age: 19 y.o. Assessment/Plan:     Visit Diagnosis: Chronic pain of both shoulders [M25.511, G89.29, M25.512]    Treatment Plan: Order MRI, Continue correcting scapular dyskinesis     []  Follow-up PRN. []  Follow-up prior to next practice/game for re-evaluation. [x]  Daily treatment/rehab. Progress note expected weekly. Subjective: Mylina reports continued, but less often radicular symptoms L>R. Objective:     Scapular dyskinesis is visually normalizing.     See treatment log below    Treatment Log:     Date: 11/16/23       Playing Status:                Exercise/Treatment        HP 10 min       Shoulder ext yellow band 3x15       Standing rows yellow 3x15       Prone pivots 3x5       Cervical MMT                                                          Date: 11/14/23       Playing Status:                Exercise/Treatment        Bike  3 min (L pain)       Shoulder Ext yellow band 5x5       Prone scapular squeezes 5x6       Prone pivots 2x5       MMT Cervical                                                          Date: 11-8-23       Playing Status:                Exercise/Treatment        bike 15 min       Prone scapular squeezes 5x5       Modified prone pivot x25       MMT Cervical                                                                  Date: 11-2-23       Playing Status:                Exercise/Treatment        HP  10 min       Bike 15 min       MMT Cervical                                                                          Date:        Playing Status:                Exercise/Treatment        Bike X 15 min       HP X 10 min       MMT Cervical                                                                          Date: 10/27/23       Playing Status:                Exercise/Treatment        Bike 15 min       HP 10       MMT Cervical

## 2023-11-20 ENCOUNTER — TELEPHONE (OUTPATIENT)
Dept: OBGYN CLINIC | Facility: CLINIC | Age: 18
End: 2023-11-20

## 2023-11-20 ENCOUNTER — ATHLETIC TRAINING (OUTPATIENT)
Dept: SPORTS MEDICINE | Facility: OTHER | Age: 18
End: 2023-11-20

## 2023-11-20 DIAGNOSIS — M79.2 RADICULAR PAIN OF LEFT UPPER EXTREMITY: ICD-10-CM

## 2023-11-20 DIAGNOSIS — M79.2 RADICULAR PAIN OF RIGHT UPPER EXTREMITY: ICD-10-CM

## 2023-11-20 DIAGNOSIS — G89.29 CHRONIC PAIN OF BOTH SHOULDERS: Primary | ICD-10-CM

## 2023-11-20 DIAGNOSIS — G89.29 CHRONIC NECK PAIN: Primary | ICD-10-CM

## 2023-11-20 DIAGNOSIS — M25.512 CHRONIC PAIN OF BOTH SHOULDERS: Primary | ICD-10-CM

## 2023-11-20 DIAGNOSIS — M25.511 CHRONIC PAIN OF BOTH SHOULDERS: Primary | ICD-10-CM

## 2023-11-20 DIAGNOSIS — R20.0 NUMBNESS AND TINGLING OF BOTH UPPER EXTREMITIES: ICD-10-CM

## 2023-11-20 DIAGNOSIS — R20.2 NUMBNESS AND TINGLING OF BOTH UPPER EXTREMITIES: ICD-10-CM

## 2023-11-20 DIAGNOSIS — M54.2 CHRONIC NECK PAIN: Primary | ICD-10-CM

## 2023-11-20 RX ORDER — GABAPENTIN 100 MG/1
100 CAPSULE ORAL
Qty: 30 CAPSULE | Refills: 1 | Status: SHIPPED | OUTPATIENT
Start: 2023-11-20

## 2023-11-20 NOTE — TELEPHONE ENCOUNTER
Patient reached out to me stating if there is any other pain medications we can try as she has stopped taking naproxen because she did not feel was providing much relief. I discussed with her that given her symptoms of burning/shooting pain of her neck going into her arm sounds radicular in nature, we could try a nerve pain medication to start with known as gabapentin. I counseled we will start at the lowest dose of 100 mg at night and taper up as needed. I counseled the side effects of gabapentin and that commonly is somnolence but can cause emotional lability as well as hostility. As I am concerned she also has an unspecified mental health disorder, I counseled for her to be aware of the symptoms and to discontinue gabapentin if she has any sensation of suicidal/homicidal ideation. Patient agreeable to start this medication and I will send it to her pharmacy on file.

## 2023-11-21 NOTE — PROGRESS NOTES
Laura Frances reports increased radicular pain primarily in her L arm. She has had increased pressure in her head also. She has not scheduled her cervical MRI as of yet.     Bike x 8 min  HP x 10  Shoulder ext 3x15  Cervical MMT

## 2023-11-27 ENCOUNTER — ATHLETIC TRAINING (OUTPATIENT)
Dept: SPORTS MEDICINE | Facility: OTHER | Age: 18
End: 2023-11-27

## 2023-11-27 DIAGNOSIS — G89.29 CHRONIC PAIN OF BOTH SHOULDERS: Primary | ICD-10-CM

## 2023-11-27 DIAGNOSIS — M25.511 CHRONIC PAIN OF BOTH SHOULDERS: Primary | ICD-10-CM

## 2023-11-27 DIAGNOSIS — M25.512 CHRONIC PAIN OF BOTH SHOULDERS: Primary | ICD-10-CM

## 2023-11-27 NOTE — PROGRESS NOTES
Athletic Training Progress Note    Name: Samanta Salter  Age: 19 y.o. Assessment/Plan:     Visit Diagnosis: Chronic pain of both shoulders [M25.511, G89.29, M25.512]    Treatment Plan: Schedule MRI, continue correction scapular dyskinesis    []  Follow-up PRN. []  Follow-up prior to next practice/game for re-evaluation. []  Daily treatment/rehab. Progress note expected weekly. Subjective: Kayy reports less severe radicular pain in her L shoulder/arm. She reports pressure in her L forehead & jaw with cervical flexion.     Objective:   See treatment log below    Treatment Log:     Date: 11/27/23       Playing Status:                Exercise/Treatment        Bike 20 min       Shoulder ext 3x15       Standing row 3x15       Prone pivots 3x5       Cervical MMT                                                          Date: 11/16/23       Playing Status:                Exercise/Treatment        HP 10 min       Shoulder ext yellow band 3x15       Standing rows yellow 3x15       Prone pivots 3x5       Cervical MMT                                                          Date: 11/14/23       Playing Status:                Exercise/Treatment        Bike  3 min (L pain)       Shoulder Ext yellow band 5x5       Prone scapular squeezes 5x6       Prone pivots 2x5       MMT Cervical                                                          Date: 11-8-23       Playing Status:                Exercise/Treatment        bike 15 min       Prone scapular squeezes 5x5       Modified prone pivot x25       MMT Cervical                                                                  Date: 11-2-23       Playing Status:                Exercise/Treatment        HP  10 min       Bike 15 min       MMT Cervical                                                                          Date:        Playing Status:                Exercise/Treatment        Bike X 15 min       HP X 10 min       MMT Cervical Date: 10/27/23       Playing Status:                Exercise/Treatment        Bike 15 min       HP 10       MMT Cervical

## 2023-11-28 DIAGNOSIS — M79.2 RADICULAR PAIN OF RIGHT UPPER EXTREMITY: ICD-10-CM

## 2023-11-28 DIAGNOSIS — M54.2 CHRONIC NECK PAIN: Primary | ICD-10-CM

## 2023-11-28 DIAGNOSIS — R20.2 NUMBNESS AND TINGLING OF BOTH UPPER EXTREMITIES: ICD-10-CM

## 2023-11-28 DIAGNOSIS — R20.0 NUMBNESS AND TINGLING OF BOTH UPPER EXTREMITIES: ICD-10-CM

## 2023-11-28 DIAGNOSIS — M79.2 RADICULAR PAIN OF LEFT UPPER EXTREMITY: ICD-10-CM

## 2023-11-28 DIAGNOSIS — M25.511 CHRONIC PAIN OF BOTH SHOULDERS: ICD-10-CM

## 2023-11-28 DIAGNOSIS — M25.512 CHRONIC PAIN OF BOTH SHOULDERS: ICD-10-CM

## 2023-11-28 DIAGNOSIS — G89.29 CHRONIC PAIN OF BOTH SHOULDERS: ICD-10-CM

## 2023-11-28 DIAGNOSIS — G89.29 CHRONIC NECK PAIN: Primary | ICD-10-CM

## 2023-11-29 ENCOUNTER — ATHLETIC TRAINING (OUTPATIENT)
Dept: SPORTS MEDICINE | Facility: OTHER | Age: 18
End: 2023-11-29

## 2023-11-29 DIAGNOSIS — M25.511 CHRONIC PAIN OF BOTH SHOULDERS: Primary | ICD-10-CM

## 2023-11-29 DIAGNOSIS — G89.29 CHRONIC PAIN OF BOTH SHOULDERS: Primary | ICD-10-CM

## 2023-11-29 DIAGNOSIS — M25.512 CHRONIC PAIN OF BOTH SHOULDERS: Primary | ICD-10-CM

## 2023-11-29 NOTE — PROGRESS NOTES
[employer] Progress Note    Name: La Nena Aragon  Age: 19 y.o. Assessment/Plan:     Visit Diagnosis: Chronic pain of both shoulders [M25.511, G89.29, M25.512]    Treatment Plan: Continue addressing scapular dyskinesis & radicular symptoms    []  Follow-up PRN. []  Follow-up prior to next practice/game for re-evaluation. [x]  Daily treatment/rehab. Progress note expected weekly. Subjective: Erick reports having been L side pain free for a few hours 11/28. She will begin formal PT before obtaining a cervical MRI.     Objective:   See treatment log below    Treatment Log:     Date: 11/29/23       Playing Status:                Exercise/Treatment        Bike 20 min       Shouulder ext 1x45       Standing row 1x45       Prone pivots 25       IASTM  Upper traps                                                         Date: 11/27/23       Playing Status:                Exercise/Treatment        Bike 20 min       Shoulder ext 3x15       Standing row 3x15       Prone pivots 3x5       Cervical MMT                                                          Date: 11/16/23       Playing Status:                Exercise/Treatment        HP 10 min       Shoulder ext yellow band 3x15       Standing rows yellow 3x15       Prone pivots 3x5       Cervical MMT                                                          Date: 11/14/23       Playing Status:                Exercise/Treatment        Bike  3 min (L pain)       Shoulder Ext yellow band 5x5       Prone scapular squeezes 5x6       Prone pivots 2x5       MMT Cervical                                                          Date: 11-8-23       Playing Status:                Exercise/Treatment        bike 15 min       Prone scapular squeezes 5x5       Modified prone pivot x25       MMT Cervical                                                                  Date: 11-2-23       Playing Status:                Exercise/Treatment        HP  10 min       Bike 15 min MMT Cervical                                                                          Date:        Playing Status:                Exercise/Treatment        Bike X 15 min       HP X 10 min       MMT Cervical                                                                          Date: 10/27/23       Playing Status:                Exercise/Treatment        Bike 15 min       HP 10       MMT Cervical

## 2023-11-30 ENCOUNTER — EVALUATION (OUTPATIENT)
Age: 18
End: 2023-11-30
Payer: COMMERCIAL

## 2023-11-30 DIAGNOSIS — M54.2 CHRONIC NECK PAIN: ICD-10-CM

## 2023-11-30 DIAGNOSIS — M79.2 RADICULAR PAIN OF RIGHT UPPER EXTREMITY: ICD-10-CM

## 2023-11-30 DIAGNOSIS — M25.512 CHRONIC PAIN OF BOTH SHOULDERS: ICD-10-CM

## 2023-11-30 DIAGNOSIS — R20.0 NUMBNESS AND TINGLING OF BOTH UPPER EXTREMITIES: ICD-10-CM

## 2023-11-30 DIAGNOSIS — R20.2 NUMBNESS AND TINGLING OF BOTH UPPER EXTREMITIES: ICD-10-CM

## 2023-11-30 DIAGNOSIS — M79.2 RADICULAR PAIN OF LEFT UPPER EXTREMITY: ICD-10-CM

## 2023-11-30 DIAGNOSIS — G89.29 CHRONIC NECK PAIN: ICD-10-CM

## 2023-11-30 DIAGNOSIS — G89.29 CHRONIC PAIN OF BOTH SHOULDERS: ICD-10-CM

## 2023-11-30 DIAGNOSIS — M25.511 CHRONIC PAIN OF BOTH SHOULDERS: ICD-10-CM

## 2023-11-30 PROCEDURE — 97110 THERAPEUTIC EXERCISES: CPT | Performed by: PHYSICAL THERAPIST

## 2023-11-30 PROCEDURE — 97162 PT EVAL MOD COMPLEX 30 MIN: CPT | Performed by: PHYSICAL THERAPIST

## 2023-11-30 NOTE — PROGRESS NOTES
PT Evaluation     Today's date: 2023  Patient name: Radha Pope  : 2005  MRN: [id number]  Referring provider: Peña Contreras  Dx:   Encounter Diagnosis     ICD-10-CM    1. Chronic neck pain  M54.2 Ambulatory Referral to Physical Therapy    G89.29       2. Numbness and tingling of both upper extremities  R20.0 Ambulatory Referral to Physical Therapy    R20.2       3. Radicular pain of left upper extremity  M79.2 Ambulatory Referral to Physical Therapy      4. Radicular pain of right upper extremity  M79.2 Ambulatory Referral to Physical Therapy      5. Chronic pain of both shoulders  M25.511 Ambulatory Referral to Physical Therapy    G89.29     M25.512           Start Time: 1400  Stop Time: 1500  Total time in clinic (min): 60 minutes    Assessment  Assessment details: Radha Pope is a 19 y.o. female presenting to PT with cc of acute cervical pain. Upon examination, patient was found to have objective deficits consisting of: decreased/painful ROM, (+) ULTT, SO hypomobility, parascapular mm spasm, and is displaying ss consistent with hyperalgesia and pain classification of Cx dysfunction. There is likely a mobility dysfunction underlying pain which is contributing to peripheral symp. Rx will aim to decrease pain before addressing underlying mobility deficits. Pt. Was educated on role of PT to address issues and given initial treatment with HEP. Pt. Would benefit from skilled physical therapy to promote improved function and maximize activity tolerance. Impairments: abnormal muscle firing, abnormal or restricted ROM, activity intolerance, impaired physical strength, lacks appropriate home exercise program, pain with function and poor posture     Symptom irritability: highUnderstanding of Dx/Px/POC: good   Prognosis: good    Goals  Short Term Functional Goals:   In 4 weeks patient will:  Symptoms will centralize proximal to the shoulder allowing pt to perform tasks at or above shoulder height for 3 min. Average pain level will be less than 4/10 over a 24hr period    Patient will be independent with HEP for symptom management and to centralize of symptoms      Long Term Functional Goals (Goals for patient at discharge): In 8 weeks patient will:  Pt will be able to cook for meal and performed all showering tasks without upper extremity pain. Patient will be able to lift 40 lb crate without Neck pain to replicate work tasks. Patient will score less than 20% disability on NDI performance measure to reflect prior level function and increased functional mobility. Pt will score at or above 62 on FOTO to reflect improved functional outcome. Plan  Patient would benefit from: skilled physical therapy  Referral necessary: No  Planned modality interventions: cryotherapy, unattended electrical stimulation and thermotherapy: hydrocollator packs  Planned therapy interventions: manual therapy, neuromuscular re-education, patient education, therapeutic exercise, therapeutic activities and home exercise program  Frequency: 2x week  Duration in weeks: 6  Plan of Care beginning date: 11/30/2023  Plan of Care expiration date: 1/9/2024  Treatment plan discussed with: patient      Subjective Evaluation    History of Present Illness  Mechanism of injury: Roby Almanzar reports 2mth Hx of lumbar pain that started while lifting weights at school. CC at this time is pain into LUE. Symptoms aggravated by using arm and head movement. Looking down increases symp and causes dizziness. Symptoms improved by stretching. Radiating pain to medial arm and 4th and 5th digit. Conservative treatment to this point includes: working in training room. Has been having L sided face pain, jaw noise, and unilateral HA.    Symptoms change throughout the day: No   Red Flags: Yes (dizziness, double vision)   Relevant Surgical Hx: none  Mechanism of injury: lifting weights  Positional preference: No               Recurrent probem    Patient Goals  Patient goals for therapy: decreased pain, increased motion, return to sport/leisure activities, independence with ADLs/IADLs and increased strength    Pain  Current pain ratin  At best pain rating: 3  At worst pain rating: 10  Quality: burning, sharp and knife-like  Aggravating factors: lifting and overhead activity  Progression: worsening    Hand dominance: right        Objective     Active Range of Motion   Cervical/Thoracic Spine       Cervical    Subcranial retraction:  with pain   Restriction level: moderate  Flexion: 35 degrees   Extension: 30 degrees      Left lateral flexion: 35 degrees      Right lateral flexion: 30 degrees     with pain  Left rotation: 60 degrees with pain  Right rotation: 70 degrees       Joint Play     Hypomobile: C1 and C2     Pain: C5 and C6     Strength/Myotome Testing   Cervical Spine     Left   Normal strength    Right   Normal strength    Tests   Cervical   Positive vertical compression and cervical distraction test.  Negative alar ligament test and Sharp-Svetlana test.     Left   Positive Spurling's Test A. Left Shoulder   Positive ULTT1 and ULTT4. Lumbar   Positive vertical compression .             Precautions: none     Daily Treatment Diary:      Initial Evaluation Date: 23  Compliance                      Visit Number 1                    Re-Eval  IE                 MC   Foto Captured y                                                Manual                                                                                        Ther-Ex                      Finger sweep x15                     Chin tuck x15                                                                                                                                                                               Edu 5min                     Neuro Re-Ed                                                                                                Ther-Act Modalities

## 2023-11-30 NOTE — LETTER
2023    Patti Campos    Patient: Omar Gifford   YOB: 2005   Date of Visit: 2023     Encounter Diagnosis     ICD-10-CM    1. Chronic neck pain  M54.2 Ambulatory Referral to Physical Therapy    G89.29       2. Numbness and tingling of both upper extremities  R20.0 Ambulatory Referral to Physical Therapy    R20.2       3. Radicular pain of left upper extremity  M79.2 Ambulatory Referral to Physical Therapy      4. Radicular pain of right upper extremity  M79.2 Ambulatory Referral to Physical Therapy      5. Chronic pain of both shoulders  M25.511 Ambulatory Referral to Physical Therapy    G89.29     M25.512           Dear Dr. Eduar Jaime:    Thank you for your recent referral of Omar Gifford. Please review the attached evaluation summary from Mylina's recent visit. Please verify that you agree with the plan of care by signing the attached order. If you have any questions or concerns, please do not hesitate to call. I sincerely appreciate the opportunity to share in the care of one of your patients and hope to have another opportunity to work with you in the near future. Sincerely,    Kamari Padilla, PT      Referring Provider:      I certify that I have read the below Plan of Care and certify the need for these services furnished under this plan of treatment while under my care. Patti Campos  [facility] [place]          PT Evaluation     Today's date: 2023  Patient name: Omar Gifford  : 2005  MRN: [id number]  Referring provider: Patti Campos  Dx:   Encounter Diagnosis     ICD-10-CM    1. Chronic neck pain  M54.2 Ambulatory Referral to Physical Therapy    G89.29       2. Numbness and tingling of both upper extremities  R20.0 Ambulatory Referral to Physical Therapy    R20.2       3. Radicular pain of left upper extremity  M79.2 Ambulatory Referral to Physical Therapy      4.  Radicular pain of right upper extremity  M79.2 Ambulatory Referral to Physical Therapy      5. Chronic pain of both shoulders  M25.511 Ambulatory Referral to Physical Therapy    G89.29     M25.512           Start Time: 1400  Stop Time: 1500  Total time in clinic (min): 60 minutes    Assessment  Assessment details: Kadie Ram is a 19 y.o. female presenting to PT with cc of acute cervical pain. Upon examination, patient was found to have objective deficits consisting of: decreased/painful ROM, (+) ULTT, SO hypomobility, parascapular mm spasm, and is displaying ss consistent with hyperalgesia and pain classification of Cx dysfunction. There is likely a mobility dysfunction underlying pain which is contributing to peripheral symp. Rx will aim to decrease pain before addressing underlying mobility deficits. Pt. Was educated on role of PT to address issues and given initial treatment with HEP. Pt. Would benefit from skilled physical therapy to promote improved function and maximize activity tolerance. Impairments: abnormal muscle firing, abnormal or restricted ROM, activity intolerance, impaired physical strength, lacks appropriate home exercise program, pain with function and poor posture     Symptom irritability: highUnderstanding of Dx/Px/POC: good   Prognosis: good    Goals  Short Term Functional Goals: In 4 weeks patient will:  Symptoms will centralize proximal to the shoulder allowing pt to perform tasks at or above shoulder height for 3 min. Average pain level will be less than 4/10 over a 24hr period    Patient will be independent with HEP for symptom management and to centralize of symptoms      Long Term Functional Goals (Goals for patient at discharge): In 8 weeks patient will:  Pt will be able to cook for meal and performed all showering tasks without upper extremity pain. Patient will be able to lift 40 lb crate without Neck pain to replicate work tasks.   Patient will score less than 20% disability on NDI performance measure to reflect prior level function and increased functional mobility. Pt will score at or above 62 on FOTO to reflect improved functional outcome. Plan  Patient would benefit from: skilled physical therapy  Referral necessary: No  Planned modality interventions: cryotherapy, unattended electrical stimulation and thermotherapy: hydrocollator packs  Planned therapy interventions: manual therapy, neuromuscular re-education, patient education, therapeutic exercise, therapeutic activities and home exercise program  Frequency: 2x week  Duration in weeks: 6  Plan of Care beginning date: 2023  Plan of Care expiration date: 2024  Treatment plan discussed with: patient      Subjective Evaluation    History of Present Illness  Mechanism of injury: Nuvia Vann reports 2mth Hx of lumbar pain that started while lifting weights at school. CC at this time is pain into LUE. Symptoms aggravated by using arm and head movement. Looking down increases symp and causes dizziness. Symptoms improved by stretching. Radiating pain to medial arm and 4th and 5th digit. Conservative treatment to this point includes: working in training room. Has been having L sided face pain, jaw noise, and unilateral HA.    Symptoms change throughout the day: No   Red Flags: Yes (dizziness, double vision)   Relevant Surgical Hx: none  Mechanism of injury: lifting weights  Positional preference: No               Recurrent probem    Patient Goals  Patient goals for therapy: decreased pain, increased motion, return to sport/leisure activities, independence with ADLs/IADLs and increased strength    Pain  Current pain ratin  At best pain rating: 3  At worst pain rating: 10  Quality: burning, sharp and knife-like  Aggravating factors: lifting and overhead activity  Progression: worsening    Hand dominance: right        Objective     Active Range of Motion   Cervical/Thoracic Spine       Cervical    Subcranial retraction:  with pain   Restriction level: moderate  Flexion: 35 degrees   Extension: 30 degrees      Left lateral flexion: 35 degrees      Right lateral flexion: 30 degrees     with pain  Left rotation: 60 degrees with pain  Right rotation: 70 degrees       Joint Play     Hypomobile: C1 and C2     Pain: C5 and C6     Strength/Myotome Testing   Cervical Spine     Left   Normal strength    Right   Normal strength    Tests   Cervical   Positive vertical compression and cervical distraction test.  Negative alar ligament test and Sharp-Svetlana test.     Left   Positive Spurling's Test A. Left Shoulder   Positive ULTT1 and ULTT4. Lumbar   Positive vertical compression .             Precautions: none     Daily Treatment Diary:      Initial Evaluation Date: 12/04/23  Compliance 11/30                     Visit Number 1                    Re-Eval  IE                 MC   Foto Captured y                           11/30                     Manual                                                                                        Ther-Ex                      Finger sweep x15                     Chin tuck x15                                                                                                                                                                               Edu 5min                     Neuro Re-Ed                                                                                                Ther-Act                                                               Modalities

## 2023-12-04 ENCOUNTER — OFFICE VISIT (OUTPATIENT)
Age: 18
End: 2023-12-04
Payer: COMMERCIAL

## 2023-12-04 DIAGNOSIS — M79.2 RADICULAR PAIN OF RIGHT UPPER EXTREMITY: ICD-10-CM

## 2023-12-04 DIAGNOSIS — R20.2 NUMBNESS AND TINGLING OF BOTH UPPER EXTREMITIES: ICD-10-CM

## 2023-12-04 DIAGNOSIS — R20.0 NUMBNESS AND TINGLING OF BOTH UPPER EXTREMITIES: ICD-10-CM

## 2023-12-04 DIAGNOSIS — M25.512 CHRONIC PAIN OF BOTH SHOULDERS: Primary | ICD-10-CM

## 2023-12-04 DIAGNOSIS — M25.511 CHRONIC PAIN OF BOTH SHOULDERS: Primary | ICD-10-CM

## 2023-12-04 DIAGNOSIS — M79.2 RADICULAR PAIN OF LEFT UPPER EXTREMITY: ICD-10-CM

## 2023-12-04 DIAGNOSIS — G89.29 CHRONIC PAIN OF BOTH SHOULDERS: Primary | ICD-10-CM

## 2023-12-04 DIAGNOSIS — M54.2 CHRONIC NECK PAIN: ICD-10-CM

## 2023-12-04 DIAGNOSIS — G89.29 CHRONIC NECK PAIN: ICD-10-CM

## 2023-12-04 PROCEDURE — 97112 NEUROMUSCULAR REEDUCATION: CPT | Performed by: PHYSICAL THERAPIST

## 2023-12-04 PROCEDURE — 97110 THERAPEUTIC EXERCISES: CPT | Performed by: PHYSICAL THERAPIST

## 2023-12-04 PROCEDURE — 97140 MANUAL THERAPY 1/> REGIONS: CPT | Performed by: PHYSICAL THERAPIST

## 2023-12-04 NOTE — PROGRESS NOTES
Daily Note     Today's date: 2023  Patient name: Jeanette Carrasquillo  : 2005  MRN: [id number]  Referring provider: Jazmine Venegas  Dx:   Encounter Diagnosis     ICD-10-CM    1. Chronic pain of both shoulders  M25.511     G89.29     M25.512       2. Chronic neck pain  M54.2     G89.29       3. Numbness and tingling of both upper extremities  R20.0     R20.2       4. Radicular pain of right upper extremity  M79.2       5. Radicular pain of left upper extremity  M79.2           Start Time: 1500  Stop Time: 1600  Total time in clinic (min): 60 minutes    Subjective: Pt reports no sig change from IE. Notes she stopped taking her gabapentin due to lack of appetite. Still having pain into LUE. Pain reported as 7/10 at start of session. Objective: See treatment diary below        Assessment: pt demonstrates fair initial response to POC. Pain limited progression on exercise and stretching. STM, mid-range motion, and postural awareness exercises all performed. Pt would benefit from continued skilled PT to resolve remaining functional impairments and facilitate return to PLOF. Plan: Continue per plan of care. Progress treatment as tolerated.          Precautions: none     Daily Treatment Diary:      Initial Evaluation Date: 23  Compliance                    Visit Number 1 2                   Re-Eval  IE                 MC   Foto Captured y                                              Manual                      SOR   6'                   Scalene pec minor STM   8'                                         Ther-Ex                      Finger sweep x15  2x10                   Chin tuck x15  2x10                   Ulnar n flossing   2x10                   Scap depression   Osc   4'                                                                                                                                 Edu 5min                     Neuro Re-Ed                      (B) banded ER   Grn 2x10 5" ea                   Posture "light bulb"  2x10 5"                                                  Ther-Act                                                               Modalities

## 2023-12-07 ENCOUNTER — OFFICE VISIT (OUTPATIENT)
Age: 18
End: 2023-12-07
Payer: COMMERCIAL

## 2023-12-07 DIAGNOSIS — M25.511 CHRONIC PAIN OF BOTH SHOULDERS: ICD-10-CM

## 2023-12-07 DIAGNOSIS — M79.2 RADICULAR PAIN OF LEFT UPPER EXTREMITY: Primary | ICD-10-CM

## 2023-12-07 DIAGNOSIS — M79.2 RADICULAR PAIN OF RIGHT UPPER EXTREMITY: ICD-10-CM

## 2023-12-07 DIAGNOSIS — G89.29 CHRONIC PAIN OF BOTH SHOULDERS: ICD-10-CM

## 2023-12-07 DIAGNOSIS — R20.2 NUMBNESS AND TINGLING OF BOTH UPPER EXTREMITIES: ICD-10-CM

## 2023-12-07 DIAGNOSIS — G89.29 CHRONIC NECK PAIN: ICD-10-CM

## 2023-12-07 DIAGNOSIS — R20.0 NUMBNESS AND TINGLING OF BOTH UPPER EXTREMITIES: ICD-10-CM

## 2023-12-07 DIAGNOSIS — M54.2 CHRONIC NECK PAIN: ICD-10-CM

## 2023-12-07 DIAGNOSIS — M25.512 CHRONIC PAIN OF BOTH SHOULDERS: ICD-10-CM

## 2023-12-07 PROCEDURE — 97110 THERAPEUTIC EXERCISES: CPT | Performed by: PHYSICAL THERAPIST

## 2023-12-07 PROCEDURE — 97112 NEUROMUSCULAR REEDUCATION: CPT | Performed by: PHYSICAL THERAPIST

## 2023-12-07 PROCEDURE — 97140 MANUAL THERAPY 1/> REGIONS: CPT | Performed by: PHYSICAL THERAPIST

## 2023-12-07 NOTE — PROGRESS NOTES
Daily Note     Today's date: 2023  Patient name: Analisa Edmonds  : 2005  MRN: [id number]  Referring provider: Harjeet Lewis MD  Dx:   Encounter Diagnosis     ICD-10-CM    1. Radicular pain of left upper extremity  M79.2       2. Chronic neck pain  M54.2     G89.29       3. Chronic pain of both shoulders  M25.511     G89.29     M25.512       4. Radicular pain of right upper extremity  M79.2       5. Numbness and tingling of both upper extremities  R20.0     R20.2           Start Time: 20  Stop Time: 1175  Total time in clinic (min): 45 minutes    Subjective: Pt reports she continues to have paresthesias and shooting pain in to (B) UE L>R. Symp increase with looking down. Also notes "nausea and feeling like a lump in my throat when I look down." Pain reported as 7/10 at start of session. Objective: See treatment diary below        Assessment: pt's description of a lump in the throat with nausea when looking down is a new finding and potentially warrants further assessment. PT will follow-up with referring physician. Sharp-jorge test performed in clinic today and was again negative. Presence of hyperalgesia and allodynia does complicate clinical picture. She demonstrates fair initial response to POC. Pain limited progression on exercise and stretching. STM performed along L brachial plexus and nerve tract followed by mid-range motion, and postural awareness exercises. Pt would benefit from continued skilled PT to resolve remaining functional impairments and facilitate return to PLOF. Plan: Continue per plan of care. Progress treatment as tolerated.          Precautions: none     Daily Treatment Diary:      Initial Evaluation Date: 23  Compliance                  Visit Number 1 2  3                 Re-Eval  [address] Captured y                                            Manual                      SOR   6'  6' Scalene pec minor STM   8'  10'                                       Ther-Ex                      Finger sweep x15  2x10  2x10 P!                  Chin tuck x15  2x10  2x10                 Ulnar n flossing   2x10  2x10                 Scap depression   Osc   4'  Osc   4'                 Sup shoulder flex c cane    2x20                                                                                                         Edu 5min                     Neuro Re-Ed                      (B) banded ER   Grn 2x10 5" ea  Grn 2x10 5" ea                 Posture "light bulb"  2x10 5" 2x10 5"                                                 Ther-Act                                                               Modalities

## 2023-12-12 ENCOUNTER — OFFICE VISIT (OUTPATIENT)
Age: 18
End: 2023-12-12
Payer: COMMERCIAL

## 2023-12-12 DIAGNOSIS — M25.512 CHRONIC PAIN OF BOTH SHOULDERS: ICD-10-CM

## 2023-12-12 DIAGNOSIS — M25.511 CHRONIC PAIN OF BOTH SHOULDERS: ICD-10-CM

## 2023-12-12 DIAGNOSIS — M79.2 RADICULAR PAIN OF RIGHT UPPER EXTREMITY: ICD-10-CM

## 2023-12-12 DIAGNOSIS — M79.2 RADICULAR PAIN OF LEFT UPPER EXTREMITY: Primary | ICD-10-CM

## 2023-12-12 DIAGNOSIS — M54.2 CHRONIC NECK PAIN: ICD-10-CM

## 2023-12-12 DIAGNOSIS — R20.0 NUMBNESS AND TINGLING OF BOTH UPPER EXTREMITIES: ICD-10-CM

## 2023-12-12 DIAGNOSIS — G89.29 CHRONIC NECK PAIN: ICD-10-CM

## 2023-12-12 DIAGNOSIS — G89.29 CHRONIC PAIN OF BOTH SHOULDERS: ICD-10-CM

## 2023-12-12 DIAGNOSIS — R20.2 NUMBNESS AND TINGLING OF BOTH UPPER EXTREMITIES: ICD-10-CM

## 2023-12-12 PROCEDURE — 97140 MANUAL THERAPY 1/> REGIONS: CPT | Performed by: PHYSICAL THERAPIST

## 2023-12-12 PROCEDURE — 97112 NEUROMUSCULAR REEDUCATION: CPT | Performed by: PHYSICAL THERAPIST

## 2023-12-12 PROCEDURE — 97110 THERAPEUTIC EXERCISES: CPT | Performed by: PHYSICAL THERAPIST

## 2023-12-13 NOTE — PROGRESS NOTES
Daily Note     Today's date: 2023  Patient name: Domi Hamilton  : 2005  MRN: [id number]  Referring provider: Porsha Posada  Dx:   Encounter Diagnosis     ICD-10-CM    1. Radicular pain of left upper extremity  M79.2       2. Chronic neck pain  M54.2     G89.29       3. Chronic pain of both shoulders  M25.511     G89.29     M25.512       4. Radicular pain of right upper extremity  M79.2       5. Numbness and tingling of both upper extremities  R20.0     R20.2           Start Time: 1130  Stop Time: 1215  Total time in clinic (min): 45 minutes    Subjective: Pt reports helping at a track meet over the weekend and being sore from that. Still has sharp pain in scapula ad into UE. Pain reported as 7/10 at start of session. Objective: See treatment diary below        Assessment: Pt demonstrates fair response to POC. Pain limited progression on exercise and stretching. STM performed along L brachial plexus and nerve tract followed by mid-range motion, and postural awareness exercises. Available range has increased since IE. Pt would benefit from continued skilled PT to resolve remaining functional impairments and facilitate return to PLOF. Plan: Continue per plan of care. Progress treatment as tolerated. Precautions: none     Daily Treatment Diary:      Initial Evaluation Date: 23  Compliance                Visit Number 1 2  3  4               Re-Eval                   [address] [place] [address] Bl[address] Captured y                                          Manual                      SOR   6'  6'  6'               Scalene pec minor STM   8'  10' 10'                                     Ther-Ex                      Finger sweep x15  2x10  2x10 P!   2x10                Chin tuck x15  2x10  2x10  2x10                Ulnar n flossing   2x10  2x10  2x10                Scap depression   Osc   4'  Osc   4'  Osc   4'               Sup shoulder flex c cane    2x20 2x20                                                                                                        Edu 5min                     Neuro Re-Ed                      (B) banded ER   Grn 2x10 5" ea  Grn 2x10 5" ea  Grn 2x10 5" ea               Posture "light bulb"  2x10 5" 2x10 5" 2x10 5"                                                Ther-Act                                                               Modalities

## 2023-12-14 ENCOUNTER — OFFICE VISIT (OUTPATIENT)
Age: 18
End: 2023-12-14
Payer: COMMERCIAL

## 2023-12-14 ENCOUNTER — APPOINTMENT (OUTPATIENT)
Age: 18
End: 2023-12-14
Payer: COMMERCIAL

## 2023-12-14 DIAGNOSIS — M79.2 RADICULAR PAIN OF RIGHT UPPER EXTREMITY: ICD-10-CM

## 2023-12-14 DIAGNOSIS — R20.0 NUMBNESS AND TINGLING OF BOTH UPPER EXTREMITIES: ICD-10-CM

## 2023-12-14 DIAGNOSIS — G89.29 CHRONIC NECK PAIN: ICD-10-CM

## 2023-12-14 DIAGNOSIS — M25.511 CHRONIC PAIN OF BOTH SHOULDERS: ICD-10-CM

## 2023-12-14 DIAGNOSIS — G89.29 CHRONIC PAIN OF BOTH SHOULDERS: ICD-10-CM

## 2023-12-14 DIAGNOSIS — M54.2 CHRONIC NECK PAIN: ICD-10-CM

## 2023-12-14 DIAGNOSIS — M25.512 CHRONIC PAIN OF BOTH SHOULDERS: ICD-10-CM

## 2023-12-14 DIAGNOSIS — M79.2 RADICULAR PAIN OF LEFT UPPER EXTREMITY: Primary | ICD-10-CM

## 2023-12-14 DIAGNOSIS — R20.2 NUMBNESS AND TINGLING OF BOTH UPPER EXTREMITIES: ICD-10-CM

## 2023-12-14 PROCEDURE — 97110 THERAPEUTIC EXERCISES: CPT | Performed by: PHYSICAL THERAPIST

## 2023-12-14 PROCEDURE — 97112 NEUROMUSCULAR REEDUCATION: CPT | Performed by: PHYSICAL THERAPIST

## 2023-12-14 PROCEDURE — 97140 MANUAL THERAPY 1/> REGIONS: CPT | Performed by: PHYSICAL THERAPIST

## 2023-12-14 NOTE — PROGRESS NOTES
Daily Note     Today's date: 2023  Patient name: Erick Baron  : 2005  MRN: [id number]  Referring provider: Manpreet Benson  Dx:   Encounter Diagnosis     ICD-10-CM    1. Radicular pain of left upper extremity  M79.2       2. Chronic neck pain  M54.2     G89.29       3. Chronic pain of both shoulders  M25.511     G89.29     M25.512       4. Radicular pain of right upper extremity  M79.2       5. Numbness and tingling of both upper extremities  R20.0     R20.2           Start Time: 1100  Stop Time: 1150  Total time in clinic (min): 50 minutes    Subjective: Pt reports no change from last session. Still has sharp pain in scapula and into UE. Pain reported as 7/10 at start of session.         Objective: See treatment diary below        Assessment: Pt demonstrates fair response to POC. Pain limited progression on exercise and stretching. STM performed along L brachial plexus and nerve tract followed by mid-range motion, and postural awareness exercises. Available range has increased since IE. Pt would benefit from continued skilled PT to resolve remaining functional impairments and facilitate return to PLOF.       Plan: Continue per plan of care.  Progress treatment as tolerated.         Precautions: none     Daily Treatment Diary:      Initial Evaluation Date: 23  Compliance              Visit Number 1 2  3  4  5             Re-Eval  IE                 MC   Foto Captured y                                        Manual                      SOR   6'  6'  6'  6'             Scalene pec minor STM   8'  10' 10'  10'                                   Ther-Ex                      Finger sweep x15  2x10  2x10 P!  2x10   2x10              Chin tuck x15  2x10  2x10  2x10   2x10              Ulnar n flossing   2x10  2x10  2x10   2x10              Scap depression   Osc   4'  Osc   4'  Osc   4'  Osc   4'             Sup shoulder flex c cane     "2x20  2x20   2x20                                                                                                      Edu 5min                     Neuro Re-Ed                      (B) banded ER   Grn 2x10 5\" ea  Grn 2x10 5\" ea  Grn 2x10 5\" ea  Grn 2x10 5\" ea             Posture \"light bulb\"  2x10 5\" 2x10 5\" 2x10 5\" 2x10 5\"                                               Ther-Act                                                               Modalities                                                                                    "

## 2023-12-19 ENCOUNTER — OFFICE VISIT (OUTPATIENT)
Age: 18
End: 2023-12-19
Payer: COMMERCIAL

## 2023-12-19 DIAGNOSIS — G89.29 CHRONIC NECK PAIN: ICD-10-CM

## 2023-12-19 DIAGNOSIS — M79.2 RADICULAR PAIN OF LEFT UPPER EXTREMITY: Primary | ICD-10-CM

## 2023-12-19 DIAGNOSIS — M25.511 CHRONIC PAIN OF BOTH SHOULDERS: ICD-10-CM

## 2023-12-19 DIAGNOSIS — R20.0 NUMBNESS AND TINGLING OF BOTH UPPER EXTREMITIES: ICD-10-CM

## 2023-12-19 DIAGNOSIS — M54.2 CHRONIC NECK PAIN: ICD-10-CM

## 2023-12-19 DIAGNOSIS — R20.2 NUMBNESS AND TINGLING OF BOTH UPPER EXTREMITIES: ICD-10-CM

## 2023-12-19 DIAGNOSIS — M79.2 RADICULAR PAIN OF RIGHT UPPER EXTREMITY: ICD-10-CM

## 2023-12-19 DIAGNOSIS — M25.512 CHRONIC PAIN OF BOTH SHOULDERS: ICD-10-CM

## 2023-12-19 DIAGNOSIS — G89.29 CHRONIC PAIN OF BOTH SHOULDERS: ICD-10-CM

## 2023-12-19 PROCEDURE — 97112 NEUROMUSCULAR REEDUCATION: CPT | Performed by: PHYSICAL THERAPIST

## 2023-12-19 PROCEDURE — 97110 THERAPEUTIC EXERCISES: CPT | Performed by: PHYSICAL THERAPIST

## 2023-12-19 PROCEDURE — 97140 MANUAL THERAPY 1/> REGIONS: CPT | Performed by: PHYSICAL THERAPIST

## 2023-12-20 NOTE — PROGRESS NOTES
"Daily Note     Today's date: 2023  Patient name: Erick Baron  : 2005  MRN: [id number]  Referring provider: Manpreet Benson  Dx:   Encounter Diagnosis     ICD-10-CM    1. Radicular pain of left upper extremity  M79.2       2. Numbness and tingling of both upper extremities  R20.0     R20.2       3. Chronic neck pain  M54.2     G89.29       4. Chronic pain of both shoulders  M25.511     G89.29     M25.512       5. Radicular pain of right upper extremity  M79.2           Start Time: 1130  Stop Time: 1220  Total time in clinic (min): 50 minutes    Subjective: Pt reports sharpness of pain has decreased in LUE and paresthesia decreased by 50%. Scapula pain persists as does nausea c looking down. Pain reported as 8/10 at start of session.         Objective: See treatment diary below        Assessment: Pt demonstrates fair response to POC. Pain limited progression on exercise and stretching. Subjective complaints remain high. STM performed along L brachial plexus and nerve tract followed by mid-range motion, and postural awareness exercises. Available range has increased since IE. Pt would benefit from continued skilled PT to resolve remaining functional impairments and facilitate return to PLOF.       Plan: Continue per plan of care.  Progress treatment as tolerated.         Precautions: none     Daily Treatment Diary:      Initial Evaluation Date: 23  Compliance            Visit Number 1 2  3  4  5  6           Re-Eval  IE                 MC   Foto Captured y                                      Manual                      SOR   6'  6'  6'  6'  6'           Scalene pec minor STM   8'  10' 10'  10'  10'                                 Ther-Ex                      Finger sweep x15  2x10  2x10 P!  2x10   2x10   2x10            Chin tuck x15  2x10  2x10  2x10   2x10   2x10            Chin tuck/lift      15x5\"       Ulnar n " "flossing   2x10  2x10  2x10   2x10   2x10            Scap depression   Osc   4'  Osc   4'  Osc   4'  Osc   4'  Osc   4'           Sup shoulder flex c cane    2x20  2x20   2x20   2x20                                                                                                   Edu 5min                     Neuro Re-Ed                      (B) banded ER   Grn 2x10 5\" ea  Grn 2x10 5\" ea  Grn 2x10 5\" ea  Grn 2x10 5\" ea  Grn 2x10 5\" ea           Posture \"light bulb\"  2x10 5\" 2x10 5\" 2x10 5\" 2x10 5\" 2x10 5\"                                              Ther-Act                                                               Modalities                                                                                    "

## 2023-12-21 ENCOUNTER — OFFICE VISIT (OUTPATIENT)
Age: 18
End: 2023-12-21
Payer: COMMERCIAL

## 2023-12-21 DIAGNOSIS — R20.0 NUMBNESS AND TINGLING OF BOTH UPPER EXTREMITIES: ICD-10-CM

## 2023-12-21 DIAGNOSIS — M25.512 CHRONIC PAIN OF BOTH SHOULDERS: ICD-10-CM

## 2023-12-21 DIAGNOSIS — R20.2 NUMBNESS AND TINGLING OF BOTH UPPER EXTREMITIES: ICD-10-CM

## 2023-12-21 DIAGNOSIS — M54.2 CHRONIC NECK PAIN: ICD-10-CM

## 2023-12-21 DIAGNOSIS — M79.2 RADICULAR PAIN OF LEFT UPPER EXTREMITY: ICD-10-CM

## 2023-12-21 DIAGNOSIS — M79.2 RADICULAR PAIN OF RIGHT UPPER EXTREMITY: Primary | ICD-10-CM

## 2023-12-21 DIAGNOSIS — G89.29 CHRONIC PAIN OF BOTH SHOULDERS: ICD-10-CM

## 2023-12-21 DIAGNOSIS — G89.29 CHRONIC NECK PAIN: ICD-10-CM

## 2023-12-21 DIAGNOSIS — M25.511 CHRONIC PAIN OF BOTH SHOULDERS: ICD-10-CM

## 2023-12-21 PROCEDURE — 97110 THERAPEUTIC EXERCISES: CPT | Performed by: PHYSICAL THERAPIST

## 2023-12-21 PROCEDURE — 97140 MANUAL THERAPY 1/> REGIONS: CPT | Performed by: PHYSICAL THERAPIST

## 2023-12-21 PROCEDURE — 97112 NEUROMUSCULAR REEDUCATION: CPT | Performed by: PHYSICAL THERAPIST

## 2023-12-21 NOTE — PROGRESS NOTES
"Daily Note     Today's date: 2023  Patient name: Erick Baron  : 2005  MRN: [id number]  Referring provider: Manpreet Benson  Dx:   Encounter Diagnosis     ICD-10-CM    1. Radicular pain of right upper extremity  M79.2       2. Radicular pain of left upper extremity  M79.2       3. Numbness and tingling of both upper extremities  R20.0     R20.2       4. Chronic neck pain  M54.2     G89.29       5. Chronic pain of both shoulders  M25.511     G89.29     M25.512           Start Time: 1100  Stop Time: 1145  Total time in clinic (min): 45 minutes    Subjective: Pt denies sig change from last session. Still sleeping approx 7hr/altagracia. Occ \"electric\" pain to L hand, but usually to elbow at this time. Pain reported as 8/10 at start of session.         Objective: See treatment diary below        Assessment:  Pain continues to be limiting factor with progression of exercise and stretching. Objective improvement noted in mm tone and mm spasm, but subjective complaints remain high. STM performed along L brachial plexus and nerve tract followed by mid-range motion, and postural awareness exercises. Available range has increased since IE. Pt would benefit from continued skilled PT to resolve remaining functional impairments and facilitate return to PLOF.       Plan: Continue per plan of care.  Progress treatment as tolerated.         Precautions: none     Daily Treatment Diary:      Initial Evaluation Date: 23  Compliance          Visit Number 1 2  3  4  5  6  7         Re-Eval  IE                 MC   Foto Captured y                                    Manual                      SOR   6'  6'  6'  6'  6'  SOR/Manual Traction6'         Scalene pec minor STM   8'  10' 10'  10'  10'  10'                               Ther-Ex                      Finger sweep x15  2x10  2x10 P!  2x10   2x10   2x10   2x10          Chin tuck " "x15  2x10  2x10  2x10   2x10   2x10   2x10          Chin tuck/lift      15x5\" 15x5\"      Ulnar n flossing   2x10  2x10  2x10   2x10   2x10   2x10          Scap depression   Osc   4'  Osc   4'  Osc   4'  Osc   4'  Osc   4'  Osc   4'         Sup shoulder flex c cane    2x20  2x20   2x20   2x20  2x20                                                                                                 Edu 5min                     Neuro Re-Ed                      (B) banded ER   Grn 2x10 5\" ea  Grn 2x10 5\" ea  Grn 2x10 5\" ea  Grn 2x10 5\" ea  Grn 2x10 5\" ea  Grn 2x10 5\" ea         Posture \"light bulb\"  2x10 5\" 2x10 5\" 2x10 5\" 2x10 5\" 2x10 5\" 2x10 5\"                                             Ther-Act                                                               Modalities                                                                                    "

## 2023-12-26 ENCOUNTER — OFFICE VISIT (OUTPATIENT)
Age: 18
End: 2023-12-26
Payer: COMMERCIAL

## 2023-12-26 DIAGNOSIS — R20.0 NUMBNESS AND TINGLING OF BOTH UPPER EXTREMITIES: ICD-10-CM

## 2023-12-26 DIAGNOSIS — G89.29 CHRONIC PAIN OF BOTH SHOULDERS: ICD-10-CM

## 2023-12-26 DIAGNOSIS — M54.2 CHRONIC NECK PAIN: ICD-10-CM

## 2023-12-26 DIAGNOSIS — M79.2 RADICULAR PAIN OF RIGHT UPPER EXTREMITY: Primary | ICD-10-CM

## 2023-12-26 DIAGNOSIS — M25.512 CHRONIC PAIN OF BOTH SHOULDERS: ICD-10-CM

## 2023-12-26 DIAGNOSIS — R20.2 NUMBNESS AND TINGLING OF BOTH UPPER EXTREMITIES: ICD-10-CM

## 2023-12-26 DIAGNOSIS — M79.2 RADICULAR PAIN OF LEFT UPPER EXTREMITY: ICD-10-CM

## 2023-12-26 DIAGNOSIS — M25.511 CHRONIC PAIN OF BOTH SHOULDERS: ICD-10-CM

## 2023-12-26 DIAGNOSIS — G89.29 CHRONIC NECK PAIN: ICD-10-CM

## 2023-12-26 PROCEDURE — 97110 THERAPEUTIC EXERCISES: CPT | Performed by: PHYSICAL THERAPIST

## 2023-12-26 PROCEDURE — 97140 MANUAL THERAPY 1/> REGIONS: CPT | Performed by: PHYSICAL THERAPIST

## 2023-12-26 PROCEDURE — 97112 NEUROMUSCULAR REEDUCATION: CPT | Performed by: PHYSICAL THERAPIST

## 2023-12-26 NOTE — PROGRESS NOTES
Daily Note     Today's date: 2023  Patient name: Erick Baron  : 2005  MRN: [id number]  Referring provider: Manpreet Benson  Dx:   Encounter Diagnosis     ICD-10-CM    1. Radicular pain of right upper extremity  M79.2       2. Chronic pain of both shoulders  M25.511     G89.29     M25.512       3. Radicular pain of left upper extremity  M79.2       4. Numbness and tingling of both upper extremities  R20.0     R20.2       5. Chronic neck pain  M54.2     G89.29           Start Time: 1115  Stop Time: 1205  Total time in clinic (min): 50 minutes    Subjective: Pt states she is having less neck pain, but shoulders are still hurting. Electric pain persists to L 4th and 5th digit. Pain reported as 6/10 at start of session.         Objective: See treatment diary below        Assessment:  mm spasm of Cx paraspinal decreased today and didn't reproduce HA.UE pain continues to be limiting factor with progression of exercise and stretching. STM performed along L brachial plexus and nerve tract followed by mid-range motion, and postural awareness exercises. Available range has increased since IE. Pt would benefit from continued skilled PT to resolve remaining functional impairments and facilitate return to PLOF.       Plan: Continue per plan of care.  Progress treatment as tolerated.         Precautions: none     Daily Treatment Diary:      Initial Evaluation Date: 23  Compliance        Visit Number 1 2  3  4  5  6  7  8       Re-Eval  IE                 MC   Foto Captured y                                  Manual                      SOR   6'  6'  6'  6'  6'  SOR/Manual Traction6'  SOR/Manual Traction6'       Scalene pec minor STM   8'  10' 10'  10'  10'  10'  10'                             Ther-Ex                      Finger sweep x15  2x10  2x10 P!  2x10   2x10   2x10   2x10   2x10        Chin tuck  "x15  2x10  2x10  2x10   2x10   2x10   2x10   10x10\"       Chin tuck/lift      15x5\" 15x5\" 15x5\"     Ulnar n flossing   2x10  2x10  2x10   2x10   2x10   2x10   2x10        Scap depression   Osc   4'  Osc   4'  Osc   4'  Osc   4'  Osc   4'  Osc   4'  Osc   4'       Sup shoulder flex c cane    2x20  2x20   2x20   2x20  2x20  2x20                                                                                               Edu 5min                     Neuro Re-Ed                      (B) banded ER   Grn 2x10 5\" ea  Grn 2x10 5\" ea  Grn 2x10 5\" ea  Grn 2x10 5\" ea  Grn 2x10 5\" ea  Grn 2x10 5\" ea  Grn 2x10 5\" ea       Posture \"light bulb\"  2x10 5\" 2x10 5\" 2x10 5\" 2x10 5\" 2x10 5\" 2x10 5\" 2x10 5\"                                            Ther-Act                                                               Modalities                                                                                    "

## 2023-12-27 ENCOUNTER — OFFICE VISIT (OUTPATIENT)
Age: 18
End: 2023-12-27
Payer: COMMERCIAL

## 2023-12-27 DIAGNOSIS — M54.2 CHRONIC NECK PAIN: ICD-10-CM

## 2023-12-27 DIAGNOSIS — M25.511 CHRONIC PAIN OF BOTH SHOULDERS: ICD-10-CM

## 2023-12-27 DIAGNOSIS — M25.512 CHRONIC PAIN OF BOTH SHOULDERS: ICD-10-CM

## 2023-12-27 DIAGNOSIS — G89.29 CHRONIC NECK PAIN: ICD-10-CM

## 2023-12-27 DIAGNOSIS — M79.2 RADICULAR PAIN OF RIGHT UPPER EXTREMITY: Primary | ICD-10-CM

## 2023-12-27 DIAGNOSIS — R20.0 NUMBNESS AND TINGLING OF BOTH UPPER EXTREMITIES: ICD-10-CM

## 2023-12-27 DIAGNOSIS — R20.2 NUMBNESS AND TINGLING OF BOTH UPPER EXTREMITIES: ICD-10-CM

## 2023-12-27 DIAGNOSIS — G89.29 CHRONIC PAIN OF BOTH SHOULDERS: ICD-10-CM

## 2023-12-27 DIAGNOSIS — M79.2 RADICULAR PAIN OF LEFT UPPER EXTREMITY: ICD-10-CM

## 2023-12-27 PROCEDURE — 97110 THERAPEUTIC EXERCISES: CPT | Performed by: PHYSICAL THERAPIST

## 2023-12-27 PROCEDURE — 97112 NEUROMUSCULAR REEDUCATION: CPT | Performed by: PHYSICAL THERAPIST

## 2023-12-27 PROCEDURE — 97140 MANUAL THERAPY 1/> REGIONS: CPT | Performed by: PHYSICAL THERAPIST

## 2023-12-27 NOTE — PROGRESS NOTES
"Daily Note     Today's date: 2023  Patient name: Erick Baron  : 2005  MRN: [id number]  Referring provider: Manpreet Benson  Dx:   Encounter Diagnosis     ICD-10-CM    1. Radicular pain of right upper extremity  M79.2       2. Chronic pain of both shoulders  M25.511     G89.29     M25.512       3. Radicular pain of left upper extremity  M79.2       4. Numbness and tingling of both upper extremities  R20.0     R20.2       5. Chronic neck pain  M54.2     G89.29             Start Time: 0815  Stop Time: 0900  Total time in clinic (min): 45 minutes    Subjective: Pt denies change since last session yesterday. Pain reported as 6/10 at start of session.         Objective: See treatment diary below        Assessment:  UE pain continues to be limiting factor with progression of exercise and stretching. STM performed along L brachial plexus and nerve tract followed by mid-range motion, and postural awareness exercises. Available range has increased since IE. Pt would benefit from continued skilled PT to resolve remaining functional impairments and facilitate return to PLOF.       Plan: Continue per plan of care.  Progress treatment as tolerated.         Precautions: none     Daily Treatment Diary:      Initial Evaluation Date: 23  Compliance      Visit Number 1 2  3  4  5  6  7  8 9     Re-Eval  IE                MC   Foto Captured y                               Manual                      SOR   6'  6'  6'  6'  6'  SOR/Manual Traction6'  SOR/Manual Traction6'  SOR/Manual Traction6'     Scalene pec minor STM   8'  10' 10'  10'  10'  10'  10'  10'                           Ther-Ex                      Finger sweep x15  2x10  2x10 P!  2x10   2x10   2x10   2x10   2x10   2x10      Chin tuck x15  2x10  2x10  2x10   2x10   2x10   2x10   10x10\"  10x10\"     Chin tuck/lift      15x5\" 15x5\" 15x5\" " "15x5\"    Ulnar n flossing   2x10  2x10  2x10   2x10   2x10   2x10   2x10   2x10      Scap depression   Osc   4'  Osc   4'  Osc   4'  Osc   4'  Osc   4'  Osc   4'  Osc   4'  Osc   4'     Sup shoulder flex c cane    2x20  2x20   2x20   2x20  2x20  2x20  2x20                                                                                             Edu 5min                     Neuro Re-Ed                      (B) banded ER   Grn 2x10 5\" ea  Grn 2x10 5\" ea  Grn 2x10 5\" ea  Grn 2x10 5\" ea  Grn 2x10 5\" ea  Grn 2x10 5\" ea  Grn 2x10 5\" ea  Grn 2x10 5\" ea     Posture \"light bulb\"  2x10 5\" 2x10 5\" 2x10 5\" 2x10 5\" 2x10 5\" 2x10 5\" 2x10 5\" 2x10 5\"                                           Ther-Act                                                               Modalities                                                                                    "

## 2023-12-28 ENCOUNTER — APPOINTMENT (OUTPATIENT)
Age: 18
End: 2023-12-28
Payer: COMMERCIAL

## 2024-01-08 ENCOUNTER — OFFICE VISIT (OUTPATIENT)
Age: 19
End: 2024-01-08
Payer: COMMERCIAL

## 2024-01-08 DIAGNOSIS — M54.2 CHRONIC NECK PAIN: ICD-10-CM

## 2024-01-08 DIAGNOSIS — M25.511 CHRONIC PAIN OF BOTH SHOULDERS: ICD-10-CM

## 2024-01-08 DIAGNOSIS — M25.512 CHRONIC PAIN OF BOTH SHOULDERS: ICD-10-CM

## 2024-01-08 DIAGNOSIS — G89.29 CHRONIC NECK PAIN: ICD-10-CM

## 2024-01-08 DIAGNOSIS — M79.2 RADICULAR PAIN OF RIGHT UPPER EXTREMITY: Primary | ICD-10-CM

## 2024-01-08 DIAGNOSIS — M79.2 RADICULAR PAIN OF LEFT UPPER EXTREMITY: ICD-10-CM

## 2024-01-08 DIAGNOSIS — G89.29 CHRONIC PAIN OF BOTH SHOULDERS: ICD-10-CM

## 2024-01-08 PROCEDURE — 97140 MANUAL THERAPY 1/> REGIONS: CPT | Performed by: PHYSICAL THERAPIST

## 2024-01-08 PROCEDURE — 97110 THERAPEUTIC EXERCISES: CPT | Performed by: PHYSICAL THERAPIST

## 2024-01-08 PROCEDURE — 97112 NEUROMUSCULAR REEDUCATION: CPT | Performed by: PHYSICAL THERAPIST

## 2024-01-08 NOTE — PROGRESS NOTES
PT Re-Evaluation     Today's date: 2024  Patient name: Erick Baron  : 2005  MRN: [id number]  Referring provider: Manpreet Benson  Dx:   Encounter Diagnosis     ICD-10-CM    1. Radicular pain of right upper extremity  M79.2       2. Radicular pain of left upper extremity  M79.2       3. Chronic pain of both shoulders  M25.511     G89.29     M25.512       4. Chronic neck pain  M54.2     G89.29           Start Time: 1045  Stop Time: 1130  Total time in clinic (min): 45 minutes    Assessment  Assessment details: Erick Baron has been seen for 10 sessions of PT related to neck pain and UE paresthesia. They have improved with: pain intensity and freq of ulnar nerve paresthesia. They remain limited with: mm spasm, hyperalgesia, nerve tension. They would benefit from continued course of PT to meet all established goals and facilitate return to PLOF.     Impairments: abnormal muscle firing, abnormal or restricted ROM, activity intolerance, impaired physical strength, lacks appropriate home exercise program, pain with function and poor posture     Symptom irritability: highUnderstanding of Dx/Px/POC: good   Prognosis: good    Goals  Short Term Functional Goals:  In 4 weeks patient will:  Symptoms will centralize proximal to the shoulder allowing pt to perform tasks at or above shoulder height for 3 min.  Average pain level will be less than 4/10 over a 24hr period    Patient will be independent with HEP for symptom management and to centralize of symptoms      Long Term Functional Goals (Goals for patient at discharge):   In 8 weeks patient will:  Pt will be able to cook for meal and performed all showering tasks without upper extremity pain.  Patient will be able to lift 40 lb crate without Neck pain to replicate work tasks.  Patient will score less than 20% disability on NDI performance measure to reflect prior level function and increased functional mobility.  Pt will score at or above 62 on FOTO to  reflect improved functional outcome.       Plan  Patient would benefit from: skilled physical therapy  Referral necessary: No  Planned modality interventions: cryotherapy, unattended electrical stimulation and thermotherapy: hydrocollator packs  Planned therapy interventions: manual therapy, neuromuscular re-education, patient education, therapeutic exercise, therapeutic activities and home exercise program  Frequency: 2x week  Duration in weeks: 4  Plan of Care beginning date: 2024  Plan of Care expiration date: 2024  Treatment plan discussed with: patient      Subjective Evaluation    History of Present Illness  Mechanism of injury:  Pt reports being about 20% back to the desired level of function at this time. Having less symp into LUE and decreased Cx pain intensity. Pt still has difficulty mm spasms, looking down, lifting anything more than a few pounds. Hasn't tried running yet, but said she jogged to get something a few weeks ago and had sharp pain right away in LUE.            Recurrent probem    Patient Goals  Patient goals for therapy: decreased pain, increased motion, return to sport/leisure activities, independence with ADLs/IADLs and increased strength    Pain  Current pain ratin  At best pain rating: 3  At worst pain rating: 10  Quality: burning, sharp and knife-like  Aggravating factors: lifting and overhead activity  Progression: worsening    Hand dominance: right        Objective     Active Range of Motion   Cervical/Thoracic Spine       Cervical    Subcranial retraction:  with pain   Restriction level: moderate  Flexion: 35 degrees   Extension: 35 degrees      Left lateral flexion: 35 degrees      Right lateral flexion: 30 degrees     with pain  Left rotation: 60 degrees with pain  Right rotation: 70 degrees       Joint Play     Hypomobile: C1 and C2     Pain: C5 and C6     Strength/Myotome Testing   Cervical Spine     Left   Normal strength    Right   Normal strength    Tests  "  Cervical   Positive vertical compression and cervical distraction test.  Negative alar ligament test and Sharp-Svetlana test.     Left   Positive Spurling's Test A.     Left Shoulder   Positive ULTT1 and ULTT4.     Lumbar   Positive vertical compression .            Precautions: none     Daily Treatment Diary:      Initial Evaluation Date: 12/04/23  Compliance 11/30 12/4 12/7 12/12 12/14 12/19 12/21 12/26 12/27 1/8   Visit Number 1 2  3  4  5  6  7  8 9  10   Re-Eval  IE                Y   Foto Captured y                Y          11/30 12/4 12/7 12/12 12/14 12/19 12/21 12/26 12/27 1/8   Manual                      SOR   6'  6'  6'  6'  6'  SOR/Manual Traction6'  SOR/Manual Traction6'  SOR/Manual Traction6'     Scalene pec minor STM   8'  10' 10'  10'  10'  10'  10'  10'  10'                         Ther-Ex                      Finger sweep x15  2x10  2x10 P!  2x10   2x10   2x10   2x10   2x10   2x10   2x10    Chin tuck x15  2x10  2x10  2x10   2x10   2x10   2x10   10x10\"  10x10\"  10x10\"   Chin tuck/lift      15x5\" 15x5\" 15x5\" 15x5\" 15x5\"   Pec stretch         6x20\" 6x20\"   Ulnar n flossing   2x10  2x10  2x10   2x10   2x10   2x10   2x10   2x10   D/C   Scap depression   Osc   4'  Osc   4'  Osc   4'  Osc   4'  Osc   4'  Osc   4'  Osc   4'  Osc   4'  Osc   4'                Sup shoulder flex c cane    2x20  2x20   2x20   2x20  2x20  2x20  2x20  Standing wall slide  2x20                                                                                           Edu 5min                     Neuro Re-Ed                      (B) banded ER   Grn 2x10 5\" ea  Grn 2x10 5\" ea  Grn 2x10 5\" ea  Grn 2x10 5\" ea  Grn 2x10 5\" ea  Grn 2x10 5\" ea  Grn 2x10 5\" ea  Grn 2x10 5\" ea  Grn 2x10 5\" ea   Posture \"light bulb\"  2x10 5\" 2x10 5\" 2x10 5\" 2x10 5\" 2x10 5\" 2x10 5\" 2x10 5\" 2x10 5\" 2x10 5\"                                          Ther-Act                                                               Modalities                "

## 2024-01-09 ENCOUNTER — ATHLETIC TRAINING (OUTPATIENT)
Dept: SPORTS MEDICINE | Facility: OTHER | Age: 19
End: 2024-01-09

## 2024-01-09 DIAGNOSIS — M79.2 RADICULAR PAIN OF RIGHT UPPER EXTREMITY: Primary | ICD-10-CM

## 2024-01-09 NOTE — PROGRESS NOTES
Athletic Training Progress Note    Name: Erick Baron  Age: 18 y.o.     Assessment/Plan:     Visit Diagnosis: Radicular pain of right upper extremity [M79.2]    Treatment Plan: Continue addressing pain patterns    []  Follow-up PRN.   []  Follow-up prior to next practice/game for re-evaluation.  [x]  Daily treatment/rehab. Progress note expected weekly.     Subjective: Erick reports continued radicular symptoms throughout the day.  She has been working out with track and has tolerated the workouts well w/radicular symptoms.  She is continuing PT over the next couple weeks.  She will check into obtaining the cervical MRI after completing PT.    Objective:   See treatment log below    Treatment Log:     Date: 1/9/23       Playing Status:                Exercise/Treatment        HP 10min       Cervical MMT        Thoracic IASTM                                                                          Date: 11/29/23       Playing Status:                Exercise/Treatment        Bike 20 min       Shouulder ext 1x45       Standing row 1x45       Prone pivots 25       IASTM  Upper traps                                                         Date: 11/27/23       Playing Status:                Exercise/Treatment        Bike 20 min       Shoulder ext 3x15       Standing row 3x15       Prone pivots 3x5       Cervical MMT                                                          Date: 11/16/23       Playing Status:                Exercise/Treatment        HP 10 min       Shoulder ext yellow band 3x15       Standing rows yellow 3x15       Prone pivots 3x5       Cervical MMT                                                          Date: 11/14/23       Playing Status:                Exercise/Treatment        Bike  3 min (L pain)       Shoulder Ext yellow band 5x5       Prone scapular squeezes 5x6       Prone pivots 2x5       MMT Cervical                                                          Date: 11-8-23       Playing  Status:                Exercise/Treatment        bike 15 min       Prone scapular squeezes 5x5       Modified prone pivot x25       MMT Cervical                                                                  Date: 11-2-23       Playing Status:                Exercise/Treatment        HP  10 min       Bike 15 min       MMT Cervical                                                                          Date:        Playing Status:                Exercise/Treatment        Bike X 15 min       HP X 10 min       MMT Cervical                                                                          Date: 10/27/23       Playing Status:                Exercise/Treatment        Bike 15 min       HP 10       MMT Cervical

## 2024-01-11 ENCOUNTER — OFFICE VISIT (OUTPATIENT)
Age: 19
End: 2024-01-11
Payer: COMMERCIAL

## 2024-01-11 DIAGNOSIS — M79.2 RADICULAR PAIN OF LEFT UPPER EXTREMITY: ICD-10-CM

## 2024-01-11 DIAGNOSIS — M25.511 CHRONIC PAIN OF BOTH SHOULDERS: ICD-10-CM

## 2024-01-11 DIAGNOSIS — M79.2 RADICULAR PAIN OF RIGHT UPPER EXTREMITY: Primary | ICD-10-CM

## 2024-01-11 DIAGNOSIS — R20.0 NUMBNESS AND TINGLING OF BOTH UPPER EXTREMITIES: ICD-10-CM

## 2024-01-11 DIAGNOSIS — M25.512 CHRONIC PAIN OF BOTH SHOULDERS: ICD-10-CM

## 2024-01-11 DIAGNOSIS — G89.29 CHRONIC PAIN OF BOTH SHOULDERS: ICD-10-CM

## 2024-01-11 DIAGNOSIS — R20.2 NUMBNESS AND TINGLING OF BOTH UPPER EXTREMITIES: ICD-10-CM

## 2024-01-11 PROCEDURE — 97112 NEUROMUSCULAR REEDUCATION: CPT | Performed by: PHYSICAL THERAPIST

## 2024-01-11 PROCEDURE — 97110 THERAPEUTIC EXERCISES: CPT | Performed by: PHYSICAL THERAPIST

## 2024-01-11 PROCEDURE — 97140 MANUAL THERAPY 1/> REGIONS: CPT | Performed by: PHYSICAL THERAPIST

## 2024-01-11 NOTE — PROGRESS NOTES
"Daily Note/Discharge     Today's date: 2024  Patient name: Erick Baron  : 2005  MRN: [id number]  Referring provider: Manpreet Benson  Dx:   Encounter Diagnosis     ICD-10-CM    1. Radicular pain of right upper extremity  M79.2       2. Radicular pain of left upper extremity  M79.2       3. Chronic pain of both shoulders  M25.511     G89.29     M25.512       4. Numbness and tingling of both upper extremities  R20.0     R20.2           Start Time: 0850  Stop Time: 930  Total time in clinic (min): 40 minutes    Subjective: No sig change from last session. Has continued pain in to LUE and down back. Did start running at track practice which she feels was good mentally, but increased her pain.      Objective: See treatment diary below      Assessment: Erick Baron has been seen for 11 sessions of PT related to neck and LUE pain. Symp intensity and HA have decreased since starting PT. They continue to be limited with freq sharp pains kalie c looking down. Also unable to lift weights s pain. Due to lack of consistent progress, she is being referred back to [facility] medical staff for further assessment. Thank you for this referral.          Plan:  D/C back to training staff.      Precautions: none     Daily Treatment Diary:      Initial Evaluation Date: 23  Compliance             Visit Number 11            Re-Eval              Foto Captured                        Manual     SOR     Scalene pec minor STM  8'        Ther-Ex     Finger sweep  2x10    Chin tuck  10x10\"   Chin tuck/lift 15x5\"   Pec stretch 6x20\"   Ulnar n flossing  D/C   Scap depression  Osc   4'       Sup shoulder flex c cane  Standing wall slide  2x20                       Edu     Neuro Re-Ed     (B) banded ER  Grn 2x10 5\" ea   Posture \"light bulb\" 2x10 5\"               Ther-Act                   Modalities                          "

## 2024-01-15 ENCOUNTER — OFFICE VISIT (OUTPATIENT)
Dept: OBGYN CLINIC | Facility: CLINIC | Age: 19
End: 2024-01-15
Payer: COMMERCIAL

## 2024-01-15 ENCOUNTER — ATHLETIC TRAINING (OUTPATIENT)
Dept: SPORTS MEDICINE | Facility: OTHER | Age: 19
End: 2024-01-15

## 2024-01-15 ENCOUNTER — APPOINTMENT (OUTPATIENT)
Age: 19
End: 2024-01-15
Payer: COMMERCIAL

## 2024-01-15 VITALS
DIASTOLIC BLOOD PRESSURE: 80 MMHG | BODY MASS INDEX: 21.66 KG/M2 | SYSTOLIC BLOOD PRESSURE: 120 MMHG | HEIGHT: 65 IN | WEIGHT: 130 LBS

## 2024-01-15 DIAGNOSIS — M79.2 RADICULAR PAIN OF RIGHT UPPER EXTREMITY: Primary | ICD-10-CM

## 2024-01-15 DIAGNOSIS — G89.29 CHRONIC NECK PAIN: Primary | ICD-10-CM

## 2024-01-15 DIAGNOSIS — R20.2 NUMBNESS AND TINGLING OF BOTH UPPER EXTREMITIES: ICD-10-CM

## 2024-01-15 DIAGNOSIS — R20.0 NUMBNESS AND TINGLING OF BOTH UPPER EXTREMITIES: ICD-10-CM

## 2024-01-15 DIAGNOSIS — M54.2 CHRONIC NECK PAIN: Primary | ICD-10-CM

## 2024-01-15 PROCEDURE — 99213 OFFICE O/P EST LOW 20 MIN: CPT | Performed by: STUDENT IN AN ORGANIZED HEALTH CARE EDUCATION/TRAINING PROGRAM

## 2024-01-15 NOTE — PROGRESS NOTES
Athletic Training Progress Note    Name: Erick Baron  Age: 18 y.o.     Assessment/Plan:     Visit Diagnosis: No primary diagnosis found.    Treatment Plan: Continue addressing pain patterns    []  Follow-up PRN.   []  Follow-up prior to next practice/game for re-evaluation.  [x]  Daily treatment/rehab. Progress note expected weekly.     Subjective: Erick reports continued radicular symptoms throughout the day.  She has been working out with track and has tolerated the workouts well w/radicular symptoms.  She is continuing PT over the next couple weeks.  She will check into obtaining the cervical MRI after completing PT.    Objective:   See treatment log below    Treatment Log:     Date: 1/9/23       Playing Status:                Exercise/Treatment        HP 10min       Cervical MMT        Thoracic IASTM                                                                          Date: 11/29/23       Playing Status:                Exercise/Treatment        Bike 20 min       Shouulder ext 1x45       Standing row 1x45       Prone pivots 25       IASTM  Upper traps                                                         Date: 11/27/23       Playing Status:                Exercise/Treatment        Bike 20 min       Shoulder ext 3x15       Standing row 3x15       Prone pivots 3x5       Cervical MMT                                                          Date: 11/16/23       Playing Status:                Exercise/Treatment        HP 10 min       Shoulder ext yellow band 3x15       Standing rows yellow 3x15       Prone pivots 3x5       Cervical MMT                                                          Date: 11/14/23       Playing Status:                Exercise/Treatment        Bike  3 min (L pain)       Shoulder Ext yellow band 5x5       Prone scapular squeezes 5x6       Prone pivots 2x5       MMT Cervical                                                          Date: 11-8-23       Playing Status:                 Exercise/Treatment        bike 15 min       Prone scapular squeezes 5x5       Modified prone pivot x25       MMT Cervical                                                                  Date: 11-2-23       Playing Status:                Exercise/Treatment        HP  10 min       Bike 15 min       MMT Cervical                                                                          Date:        Playing Status:                Exercise/Treatment        Bike X 15 min       HP X 10 min       MMT Cervical                                                                          Date: 10/27/23       Playing Status:                Exercise/Treatment        Bike 15 min       HP 10       MMT Cervical                                                                                  1/15  Completed shoulder, neck, and ft exercises.  LB ATC

## 2024-01-17 NOTE — PROGRESS NOTES
1. Chronic neck pain  Ambulatory referral to Spine & Pain Management      2. Numbness and tingling of both upper extremities  Ambulatory referral to Spine & Pain Management        Orders Placed This Encounter   Procedures    Ambulatory referral to Spine & Pain Management        Imaging Studies (I personally reviewed images in PACS and report):    MRI arthrogram left shoulder 11/1/2023: No rotator cuff tear discrete labral tear.  Trace loculated fluid noted within the subacromial/subpatellar bursa      IMPRESSION:  Chronic midline paracervical neck paim  Reports radicular pain/paresthesias of upper extremities in no specific pattern. (Ddx: secondary to paracervical myofascial tightness from stress, thoracic outlet disorder, cervical herniated disc, cubital tunnel syndrome)   Unspecified mood disorder - possibly bipolar disorder and likely a contributing factor to her chronic pain symptoms. Currently established with her PCP/CBT   Minimal to no improvement with conservative treatments of formal physical therapy since last visit.  Briefly was on gabapentin which did alleviate some of her pain but cannot tolerate being on the 100 mg nightly as it reported suppressed her appetite.  Date of Injury: 9/29/2023  School: [employer]  Sport: Track and field    Other factors:  Unspecified mood disorder/bipolar disorder- ongoing issue for years and has currently been referred to psychiatry.  I do feel a portion of her ongoing chronic pain and paresthesias are contributory to this issue as well.    PLAN:    Clinical exam and radiographic imaging reviewed with patient today, with impression as per above. I have discussed with the patient the pathophysiology of this diagnosis and reviewed how the examination correlates with this diagnosis.     Given her limited improvement since last visit, I will have my office resubmit the MRI of her cervical spine without contrast for further evaluation.  Furthermore, I referred her to  "spine and pain management after she obtains an MRI to discuss other treatment modalities going forward to help improve her pain.  I counseled that typically gabapentin does not suppress a person's appetite but if she does not want to continue this medication there could be consideration for other nerve pain related medications including Lyrica.  She may also benefit from Cymbalta.  However, I am unfamiliar with prescribing these medications on her regular basis especially given her unspecified mood disorder.  I will defer this to pain management to determine.    No follow-ups on file.      Portions of the record may have been created with voice recognition software. Occasional wrong word or \"sound a like\" substitutions may have occurred due to the inherent limitations of voice recognition software. Read the chart carefully and recognize, using context, where substitutions have occurred.     CHIEF COMPLAINT:  Bilateral shoulder pain, left greater than right follow-up      HPI:  Erick Baron is a 18 y.o. female  who presents for     Visit 1/15/2024:  Follow-up evaluation of neck/occipital pain, bilateral upper extremity pain  She has been attending formal physical therapy since her last visit from 11/30/2023 through 11/11/2024.  Patient states there is minimal improvement since last visit.  She reports improvements include slightly decreased intensity and frequency of pain.  She actually did feel that she was an attempt to return back to our sport about a week ago but when she started becoming more active she reportedly experienced worsening pain over her midline and paracervical neck radiating into her shoulders and arms as well as the occipital aspect of her head.  She describes as a pressure-like/shooting type of pain.  She states the pain can sometimes even radiate to her jaws.  In regards to gabapentin prescribed last visit, she actually did state there was a significant reduction of pain of about \"60 to 70%.\" "  However, she states while on gabapentin briefly she noticed her appetite was becoming suppressed.  She states discussing this with another provider and it was suggested that she stop the medication thus she is currently not taking any pain medications at this time.  Patient reports that any range of motion movements of her neck or use of her upper extremities aggravate the pain.  She is interested in further evaluation going forward.  Patient is very tearful when discussing about her symptoms during the visit today as she is concerned and frustrated with her limited progress.    Visit 1/13/2023:  Follow-up evaluation of neck and bilateral shoulder pain, left greater than right  MRI arthrogram of the left shoulder was obtained since last visit and reviewed as noted above.  Patient has been restricted from returning back to her sport at this time and working on rehab with her athletic training team.  She reports that despite the unremarkable findings of her MRI arthrogram other than trace fluid of the bursa, she continues to experience pain over her anterior/lateral/posterior shoulders that radiate up to her neck and sometimes down her arm into her hand.  She does report improvement and that her shoulder is no longer click.  She reports continued use of naproxen daily but is unsure whether it is providing much relief.  She states that shooting pain of her upper extremities and paresthesias of her upper extremities seem to be aggravated with range of motion movements of her neck.  She states the paresthesias can range from being over her pinky and ring digit to her index finger to the palmar and dorsal aspects of her hand.  She states there is no specific pattern of what aggravates these issues.  Patient is frustrated due to the results of her MRI to determine source of her pain.  She does understand that her mental health is a possible contributing factor but has not started any medication for this issue yet.  She  continues to see cognitive behavioral therapy as well as her PCP.    Visit 10/9/2023:  Follow-up evaluation of bilateral shoulder pain, left worse than right with a precipitating injury while doing shoulder exercises/shoulder lifting exercises    Of note, patient does have a reported history of chronic neck and bilateral shoulder pain for several years.  Reports crepitus/clicking/popping of her shoulders as well.  He also reports chronic bilateral hand paresthesias intermittent episodes of dropping objects.    Patient reports no difference in regards to the pain of her shoulders and neck since last visit.  She reports clicking/popping of her shoulders bilaterally.  She does report some improvement in regards to her shoulder range of motion since seeing her athletic trainers for therapy.  She feels that her strength is intact of her arms despite pain being exacerbated with any lifting/pushing/pulling motions.  She describes obtained as burning/tight/sharp and can be occurring over the anterior/lateral/posterior aspects of her shoulders and can radiate up the her neck.  She feels the electric stimulation machine helps reduce some degree of the burning pain from her neck and paracervical shoulders.  She reports no relief from the oral naproxen prescribed previously.    Separately, in regards to her mental health, she did see her PCP for this issue and she was referred to psychiatry going forward as there is potential concern for bipolar disorder.  Patient states that her sibling also has a history of bipolar disorder as well.        Medical, Surgical, Family, and Social History    No past medical history on file.  Past Surgical History:   Procedure Laterality Date    FL INJECTION LEFT SHOULDER (ARTHROGRAM)  11/1/2023     Social History   Social History     Substance and Sexual Activity   Alcohol Use None     Social History     Substance and Sexual Activity   Drug Use Not on file     Social History     Tobacco Use  "  Smoking Status Not on file   Smokeless Tobacco Not on file     No family history on file.  No Known Allergies       Physical Exam  /80   Ht 5' 5\" (1.651 m)   Wt 59 kg (130 lb)   BMI 21.63 kg/m²     Constitutional:  see vital signs  Gen: well-developed, normocephalic/atraumatic, well-groomed  Pulmonary/Chest: Effort normal. No respiratory distress.   NEURO: cranial nerves grossly intact  PSYCH:  Alert and oriented to person, place, and time; recent and remote memory intact; mood depressed/anxious; patient is tearful towards the end of the visit as she discusses her overall mood symptoms that she has been experiencing    Ortho Exam  Cervical  ROM: intact in all planes of motion, but patient reports midline and paracervical neck pain with active neck flexion/extension/lateral bending. Reports shooting pain/numbness of her LUE during maneuvers.  Midline spinous process tenderness: +diffusely along midline, occipital aspect of head  Muscular Tenderness: +b/l paracervical  Sensation UE Bilateral:  Strength UE: 5/5 elbow, wrist, fingers bilateral  Spurlings: negative b/l but aggravates neck pain    OK sign: intact b/l  Thumb extension test: intact b/l  Thumb abduction resistance testing: intact b/l    Tinel's test: + on left, negative on right                 Procedures        "

## 2024-01-18 ENCOUNTER — TELEPHONE (OUTPATIENT)
Dept: OBGYN CLINIC | Facility: CLINIC | Age: 19
End: 2024-01-18

## 2024-01-18 ENCOUNTER — ATHLETIC TRAINING (OUTPATIENT)
Dept: SPORTS MEDICINE | Facility: OTHER | Age: 19
End: 2024-01-18

## 2024-01-18 ENCOUNTER — APPOINTMENT (OUTPATIENT)
Age: 19
End: 2024-01-18
Payer: COMMERCIAL

## 2024-01-18 DIAGNOSIS — M79.2 RADICULAR PAIN OF RIGHT UPPER EXTREMITY: Primary | ICD-10-CM

## 2024-01-18 NOTE — PROGRESS NOTES
Athletic Training Progress Note    Name: Erick Baron  Age: 18 y.o.     Assessment/Plan:     Visit Diagnosis: Radicular pain of right upper extremity [M79.2]    Treatment Plan: Obtain cervicle MRI, Continue treatment and activity as tolerated.    []  Follow-up PRN.   []  Follow-up prior to next practice/game for re-evaluation.  [x]  Daily treatment/rehab. Progress note expected weekly.     Subjective: Erick reports feeling continued radicular pain down her R arm, R&L upper trap & pressure in her head during flexion/extension movement.    Objective:   See treatment log below    Treatment Log:     Date:        Playing Status:                Exercise/Treatment                                                                                                  Date: 1/9/23       Playing Status:                Exercise/Treatment        HP 10min       Cervical MMT        Thoracic IASTM                                                                          Date: 11/29/23       Playing Status:                Exercise/Treatment        Bike 20 min       Shouulder ext 1x45       Standing row 1x45       Prone pivots 25       IASTM  Upper traps                                                         Date: 11/27/23       Playing Status:                Exercise/Treatment        Bike 20 min       Shoulder ext 3x15       Standing row 3x15       Prone pivots 3x5       Cervical MMT                                                          Date: 11/16/23       Playing Status:                Exercise/Treatment        HP 10 min       Shoulder ext yellow band 3x15       Standing rows yellow 3x15       Prone pivots 3x5       Cervical MMT                                                          Date: 11/14/23       Playing Status:                Exercise/Treatment        Bike  3 min (L pain)       Shoulder Ext yellow band 5x5       Prone scapular squeezes 5x6       Prone pivots 2x5       MMT Cervical                                                           Date: 11-8-23       Playing Status:                Exercise/Treatment        bike 15 min       Prone scapular squeezes 5x5       Modified prone pivot x25       MMT Cervical                                                                  Date: 11-2-23       Playing Status:                Exercise/Treatment        HP  10 min       Bike 15 min       MMT Cervical                                                                          Date:        Playing Status:                Exercise/Treatment        Bike X 15 min       HP X 10 min       MMT Cervical                                                                          Date: 10/27/23       Playing Status:                Exercise/Treatment        Bike 15 min       HP 10       MMT Cervical

## 2024-01-21 ENCOUNTER — HOSPITAL ENCOUNTER (OUTPATIENT)
Dept: MRI IMAGING | Facility: HOSPITAL | Age: 19
Discharge: HOME | End: 2024-01-21
Payer: COMMERCIAL

## 2024-01-21 DIAGNOSIS — M54.2 CHRONIC NECK PAIN: ICD-10-CM

## 2024-01-21 DIAGNOSIS — R20.0 NUMBNESS AND TINGLING OF BOTH UPPER EXTREMITIES: ICD-10-CM

## 2024-01-21 DIAGNOSIS — G89.29 CHRONIC NECK PAIN: ICD-10-CM

## 2024-01-21 DIAGNOSIS — R20.2 NUMBNESS AND TINGLING OF BOTH UPPER EXTREMITIES: ICD-10-CM

## 2024-01-21 PROCEDURE — G1004 CDSM NDSC: HCPCS

## 2024-01-21 PROCEDURE — 72141 MRI NECK SPINE W/O DYE: CPT

## 2024-01-22 ENCOUNTER — OFFICE VISIT (OUTPATIENT)
Dept: OBGYN CLINIC | Facility: CLINIC | Age: 19
End: 2024-01-22
Payer: COMMERCIAL

## 2024-01-22 ENCOUNTER — APPOINTMENT (OUTPATIENT)
Age: 19
End: 2024-01-22
Payer: COMMERCIAL

## 2024-01-22 VITALS
HEART RATE: 80 BPM | BODY MASS INDEX: 21.66 KG/M2 | SYSTOLIC BLOOD PRESSURE: 120 MMHG | DIASTOLIC BLOOD PRESSURE: 80 MMHG | WEIGHT: 130 LBS | HEIGHT: 65 IN

## 2024-01-22 DIAGNOSIS — G89.29 CHRONIC NECK PAIN: Primary | ICD-10-CM

## 2024-01-22 DIAGNOSIS — R20.0 NUMBNESS AND TINGLING OF BOTH UPPER EXTREMITIES: ICD-10-CM

## 2024-01-22 DIAGNOSIS — R51.9 CHRONIC NONINTRACTABLE HEADACHE, UNSPECIFIED HEADACHE TYPE: ICD-10-CM

## 2024-01-22 DIAGNOSIS — G89.29 CHRONIC NONINTRACTABLE HEADACHE, UNSPECIFIED HEADACHE TYPE: ICD-10-CM

## 2024-01-22 DIAGNOSIS — M54.81 OCCIPITAL NEURALGIA, UNSPECIFIED LATERALITY: ICD-10-CM

## 2024-01-22 DIAGNOSIS — M54.2 CHRONIC NECK PAIN: Primary | ICD-10-CM

## 2024-01-22 DIAGNOSIS — R20.2 NUMBNESS AND TINGLING OF BOTH UPPER EXTREMITIES: ICD-10-CM

## 2024-01-22 PROCEDURE — 99213 OFFICE O/P EST LOW 20 MIN: CPT | Performed by: STUDENT IN AN ORGANIZED HEALTH CARE EDUCATION/TRAINING PROGRAM

## 2024-01-22 NOTE — PROGRESS NOTES
1. Chronic neck pain  Ambulatory Referral to Neurology      2. Numbness and tingling of both upper extremities  Ambulatory Referral to Neurology      3. Chronic nonintractable headache, unspecified headache type  Ambulatory Referral to Neurology      4. Occipital neuralgia, unspecified laterality  Ambulatory Referral to Neurology          Orders Placed This Encounter   Procedures    Ambulatory Referral to Neurology        Imaging Studies (I personally reviewed images in PACS and report):    MRI cervical spine without contrast 1/21/2024: Normal MRI of the cervical spine.  MRI arthrogram left shoulder 11/1/2023: No rotator cuff tear discrete labral tear.  Trace loculated fluid noted within the subacromial/subpatellar bursa      IMPRESSION:  Chronic midline paracervical neck paim  Reports radicular pain/paresthesias of upper extremities in no specific pattern. (Ddx: secondary to paracervical myofascial tightness from stress, thoracic outlet disorder, cervical herniated disc, cubital tunnel syndrome)   Unspecified mood disorder - possibly bipolar disorder and likely a contributing factor to her chronic pain symptoms. Currently established with her PCP/CBT   Minimal to no improvement with conservative treatments of formal physical therapy since last visit.  Briefly was on gabapentin which did alleviate some of her pain but cannot tolerate being on the 100 mg nightly as it reported suppressed her appetite.  MRI cervical spine imaging unremarkable  Date of Injury: 9/29/2023  School: [employer]  Sport: Track and field    Other factors:  Unspecified mood disorder/bipolar disorder- ongoing issue for years and has currently been referred to psychiatry.  I do feel a portion of her ongoing chronic pain and paresthesias are contributory to this issue as well.    PLAN:    Clinical exam and radiographic imaging reviewed with patient today, with impression as per above. I have discussed with the patient the pathophysiology  "of this diagnosis and reviewed how the examination correlates with this diagnosis.     Reviewed MRI of her cervical spine as noted above.  I reassured her that there were no findings to indicate surgical intervention or a significant nerve impingement.  Counseled at this point continue conservative treatments but she can discuss with pain management to determine whether occipital nerve injections or trigger point injections may be warranted or other treatment modalities.  Patient is also interested in discussing with neurology as well for second opinion as she now complains of head pressure/migraine symptoms.  I will place a concurrent referral to neurology going forward as well.  In regards to sports, she can continue to participate as tolerated under the discretion of her athletic training team.  In regards to gabapentin, I counseled her she can attempt to restart again at a different point of the day in the morning instead of at night.  I did  the typical side effect is grogginess and sleepiness from gabapentin.  I counseled that I try to minimize the side effect I put her on the lowest dose possible.  May also benefit from Cymbalta versus Lyrica but I will defer this to pain management to decide whether to start as patient is hesitant given her mental health disorders.    Return for Follow-up with neurology and pain management.      Portions of the record may have been created with voice recognition software. Occasional wrong word or \"sound a like\" substitutions may have occurred due to the inherent limitations of voice recognition software. Read the chart carefully and recognize, using context, where substitutions have occurred.     CHIEF COMPLAINT:  Bilateral shoulder pain, left greater than right follow-up      HPI:  Erick Baron is a 18 y.o. female  who presents for     Visit 1/22/2024:  Follow-up evaluation of headache/neck/occipital pain/bilateral upper extremity paresthesias pain:  Patient was " "able to obtain an MRI of her cervical spine since last visit as noted above.  Reportedly was unremarkable on imaging study review.  Patient states that she has recently attempted to return back to sports with accommodations and feels that she has been performing well despite experiencing her head pressure and midline paracervical neck pain.  She states she was discussing with her family as well the patient is not interested in discussing with neurology about her head pressure/pain symptoms concurrent with her neck pain.  She has not set up an appointment with pain management yet but will also consider seeing them as well.  She has not restarted gabapentin yet due to her concern about appetite suppression.  She thinks it may be trying on a different point of the day may have less effect on her appetite and is willing to retry again as it did previously provide a significant reduction in her pain intensity.    Visit 1/15/2024:  Follow-up evaluation of neck/occipital pain, bilateral upper extremity pain  She has been attending formal physical therapy since her last visit from 11/30/2023 through 11/11/2024.  Patient states there is minimal improvement since last visit.  She reports improvements include slightly decreased intensity and frequency of pain.  She actually did feel that she was an attempt to return back to our sport about a week ago but when she started becoming more active she reportedly experienced worsening pain over her midline and paracervical neck radiating into her shoulders and arms as well as the occipital aspect of her head.  She describes as a pressure-like/shooting type of pain.  She states the pain can sometimes even radiate to her jaws.  In regards to gabapentin prescribed last visit, she actually did state there was a significant reduction of pain of about \"60 to 70%.\"  However, she states while on gabapentin briefly she noticed her appetite was becoming suppressed.  She states discussing this " with another provider and it was suggested that she stop the medication thus she is currently not taking any pain medications at this time.  Patient reports that any range of motion movements of her neck or use of her upper extremities aggravate the pain.  She is interested in further evaluation going forward.  Patient is very tearful when discussing about her symptoms during the visit today as she is concerned and frustrated with her limited progress.    Visit 1/13/2023:  Follow-up evaluation of neck and bilateral shoulder pain, left greater than right  MRI arthrogram of the left shoulder was obtained since last visit and reviewed as noted above.  Patient has been restricted from returning back to her sport at this time and working on rehab with her athletic training team.  She reports that despite the unremarkable findings of her MRI arthrogram other than trace fluid of the bursa, she continues to experience pain over her anterior/lateral/posterior shoulders that radiate up to her neck and sometimes down her arm into her hand.  She does report improvement and that her shoulder is no longer click.  She reports continued use of naproxen daily but is unsure whether it is providing much relief.  She states that shooting pain of her upper extremities and paresthesias of her upper extremities seem to be aggravated with range of motion movements of her neck.  She states the paresthesias can range from being over her pinky and ring digit to her index finger to the palmar and dorsal aspects of her hand.  She states there is no specific pattern of what aggravates these issues.  Patient is frustrated due to the results of her MRI to determine source of her pain.  She does understand that her mental health is a possible contributing factor but has not started any medication for this issue yet.  She continues to see cognitive behavioral therapy as well as her PCP.    Visit 10/9/2023:  Follow-up evaluation of bilateral shoulder  pain, left worse than right with a precipitating injury while doing shoulder exercises/shoulder lifting exercises    Of note, patient does have a reported history of chronic neck and bilateral shoulder pain for several years.  Reports crepitus/clicking/popping of her shoulders as well.  He also reports chronic bilateral hand paresthesias intermittent episodes of dropping objects.    Patient reports no difference in regards to the pain of her shoulders and neck since last visit.  She reports clicking/popping of her shoulders bilaterally.  She does report some improvement in regards to her shoulder range of motion since seeing her athletic trainers for therapy.  She feels that her strength is intact of her arms despite pain being exacerbated with any lifting/pushing/pulling motions.  She describes obtained as burning/tight/sharp and can be occurring over the anterior/lateral/posterior aspects of her shoulders and can radiate up the her neck.  She feels the electric stimulation machine helps reduce some degree of the burning pain from her neck and paracervical shoulders.  She reports no relief from the oral naproxen prescribed previously.    Separately, in regards to her mental health, she did see her PCP for this issue and she was referred to psychiatry going forward as there is potential concern for bipolar disorder.  Patient states that her sibling also has a history of bipolar disorder as well.        Medical, Surgical, Family, and Social History    History reviewed. No pertinent past medical history.  Past Surgical History:   Procedure Laterality Date    FL INJECTION LEFT SHOULDER (ARTHROGRAM)  11/1/2023     Social History   Social History     Substance and Sexual Activity   Alcohol Use None     Social History     Substance and Sexual Activity   Drug Use Not on file     Social History     Tobacco Use   Smoking Status Not on file   Smokeless Tobacco Not on file     History reviewed. No pertinent family history.  No  "Known Allergies       Physical Exam  /80   Pulse 80   Ht 5' 5\" (1.651 m)   Wt 59 kg (130 lb)   BMI 21.63 kg/m²     Constitutional:  see vital signs  Gen: well-developed, normocephalic/atraumatic, well-groomed  Pulmonary/Chest: Effort normal. No respiratory distress.   NEURO: cranial nerves grossly intact  PSYCH:  Alert and oriented to person, place, and time; recent and remote memory intact; mood depressed/anxious; patient is tearful towards the end of the visit as she discusses her overall mood symptoms that she has been experiencing    Ortho Exam          Procedures        "

## 2024-01-23 ENCOUNTER — ATHLETIC TRAINING (OUTPATIENT)
Dept: SPORTS MEDICINE | Facility: OTHER | Age: 19
End: 2024-01-23

## 2024-01-23 DIAGNOSIS — M79.2 RADICULAR PAIN OF RIGHT UPPER EXTREMITY: Primary | ICD-10-CM

## 2024-01-24 ENCOUNTER — ATHLETIC TRAINING (OUTPATIENT)
Dept: SPORTS MEDICINE | Facility: OTHER | Age: 19
End: 2024-01-24

## 2024-01-24 DIAGNOSIS — M79.2 RADICULAR PAIN OF RIGHT UPPER EXTREMITY: Primary | ICD-10-CM

## 2024-01-24 DIAGNOSIS — M79.673 FOOT ARCH PAIN, UNSPECIFIED LATERALITY: ICD-10-CM

## 2024-01-24 NOTE — PROGRESS NOTES
Athletic Training Progress Note    Name: Erick Baron  Age: 18 y.o.     Assessment/Plan:     Visit Diagnosis: No primary diagnosis found.    Treatment Plan: Obtain cervicle MRI, Continue treatment and activity as tolerated.    []  Follow-up PRN.   []  Follow-up prior to next practice/game for re-evaluation.  [x]  Daily treatment/rehab. Progress note expected weekly.     Subjective: Erick reports feeling continued radicular pain down her R arm, R&L upper trap & pressure in her head during flexion/extension movement.    Objective:   See treatment log below    Treatment Log:     Date:        Playing Status:                Exercise/Treatment                                                                                                  Date: 1/9/23       Playing Status:                Exercise/Treatment        HP 10min       Cervical MMT        Thoracic IASTM                                                                          Date: 11/29/23       Playing Status:                Exercise/Treatment        Bike 20 min       Shouulder ext 1x45       Standing row 1x45       Prone pivots 25       IASTM  Upper traps                                                         Date: 11/27/23       Playing Status:                Exercise/Treatment        Bike 20 min       Shoulder ext 3x15       Standing row 3x15       Prone pivots 3x5       Cervical MMT                                                          Date: 11/16/23       Playing Status:                Exercise/Treatment        HP 10 min       Shoulder ext yellow band 3x15       Standing rows yellow 3x15       Prone pivots 3x5       Cervical MMT                                                          Date: 11/14/23       Playing Status:                Exercise/Treatment        Bike  3 min (L pain)       Shoulder Ext yellow band 5x5       Prone scapular squeezes 5x6       Prone pivots 2x5       MMT Cervical                                                           Date: 11-8-23       Playing Status:                Exercise/Treatment        bike 15 min       Prone scapular squeezes 5x5       Modified prone pivot x25       MMT Cervical                                                                  Date: 11-2-23       Playing Status:                Exercise/Treatment        HP  10 min       Bike 15 min       MMT Cervical                                                                          Date:        Playing Status:                Exercise/Treatment        Bike X 15 min       HP X 10 min       MMT Cervical                                                                          Date: 10/27/23       Playing Status:                Exercise/Treatment        Bike 15 min       HP 10       MMT Cervical                                                                                1/24/24  Attempted a truth about face to share that her constant flip flop may be part of her pain patterns.  Completed mobilizations of everything.  LB ATC

## 2024-01-24 NOTE — PROGRESS NOTES
Athletic Training Progress Note     Name: Erick Baron  Age: 18 y.o.      Assessment/Plan:      Visit Diagnosis: Radicular pain of right upper extremity [M79.2]     Treatment Plan: Obtain cervicle MRI, Continue treatment and activity as tolerated.     []  Follow-up PRN.   []  Follow-up prior to next practice/game for re-evaluation.  [x]  Daily treatment/rehab. Progress note expected weekly.      Subjective: Erick reports feeling continued radicular pain down her R arm, R&L upper trap & pressure in her head during flexion/extension movement.     Objective:   See treatment log below     Treatment Lo/23/24:  Erick still reports pressure in her head when she moves her head side to side. They are tearful during their appointment when stating the symptoms of numbness and tingling in their hand and pressure in their head. They also state they are experiencing discomfort in their arches, and low back.  Today we completed: Occipital release technique and resisted shoulder shrugs. They state that they feel like they need an CT scan of their head.  CM    Date:             Playing Status:                           Exercise/Treatment                                                                                                                                                                          Date: 23           Playing Status:                           Exercise/Treatment             HP 10min           Cervical MMT             Thoracic IASTM                                                                                                                                Date: 23           Playing Status:                           Exercise/Treatment             Bike 20 min           Shouulder ext 1x45           Standing row 1x45           Prone pivots 25           IASTM  Upper traps                                                                                                  Date: 23            Playing Status:                           Exercise/Treatment             Bike 20 min           Shoulder ext 3x15           Standing row 3x15           Prone pivots 3x5           Cervical MMT                                                                                                    Date: 11/16/23           Playing Status:                           Exercise/Treatment             HP 10 min           Shoulder ext yellow band 3x15           Standing rows yellow 3x15           Prone pivots 3x5           Cervical MMT                                                                                                    Date: 11/14/23           Playing Status:                           Exercise/Treatment             Bike  3 min (L pain)           Shoulder Ext yellow band 5x5           Prone scapular squeezes 5x6           Prone pivots 2x5           MMT Cervical                                                                                                    Date: 11-8-23           Playing Status:                           Exercise/Treatment             bike 15 min           Prone scapular squeezes 5x5           Modified prone pivot x25           MMT Cervical                                                                                                                  Date: 11-2-23           Playing Status:                           Exercise/Treatment             HP  10 min           Bike 15 min           MMT Cervical                                                                                                                                Date:             Playing Status:                           Exercise/Treatment             Bike X 15 min           HP X 10 min           MMT Cervical                                                                                                                                Date: 10/27/23           Playing Status:                           Exercise/Treatment             Bike  15 min           HP 10           MMT Cervical

## 2024-01-25 ENCOUNTER — APPOINTMENT (OUTPATIENT)
Age: 19
End: 2024-01-25
Payer: COMMERCIAL

## 2024-01-29 DIAGNOSIS — M79.2 RADICULAR PAIN OF RIGHT UPPER EXTREMITY: ICD-10-CM

## 2024-01-29 DIAGNOSIS — M54.2 CHRONIC NECK PAIN: ICD-10-CM

## 2024-01-29 DIAGNOSIS — R20.0 NUMBNESS AND TINGLING OF BOTH UPPER EXTREMITIES: ICD-10-CM

## 2024-01-29 DIAGNOSIS — M79.2 RADICULAR PAIN OF LEFT UPPER EXTREMITY: ICD-10-CM

## 2024-01-29 DIAGNOSIS — G89.29 CHRONIC NECK PAIN: ICD-10-CM

## 2024-01-29 DIAGNOSIS — R20.2 NUMBNESS AND TINGLING OF BOTH UPPER EXTREMITIES: ICD-10-CM

## 2024-01-29 RX ORDER — GABAPENTIN 100 MG/1
100 CAPSULE ORAL 3 TIMES DAILY
Qty: 90 CAPSULE | Refills: 1 | Status: SHIPPED | OUTPATIENT
Start: 2024-01-29

## 2024-01-31 ENCOUNTER — ATHLETIC TRAINING (OUTPATIENT)
Dept: SPORTS MEDICINE | Facility: OTHER | Age: 19
End: 2024-01-31

## 2024-01-31 DIAGNOSIS — M79.673 FOOT ARCH PAIN, UNSPECIFIED LATERALITY: Primary | ICD-10-CM

## 2024-01-31 NOTE — PROGRESS NOTES
Erick reports minimal pain in her lower legs.  R ankle still exhibits pain during activity but she is able to tolerate the pain and continue participating.  C/O R SI pain after jumping yesterday. R hip upslip    Toe yoga x 25  Heel/Toe walking 4 laps  Post tib heel raises 4x15 w/minimal R ankle pain  Airex pad balance hops  Stretching    R hip adjustment

## 2024-02-01 ENCOUNTER — ATHLETIC TRAINING (OUTPATIENT)
Dept: SPORTS MEDICINE | Facility: OTHER | Age: 19
End: 2024-02-01

## 2024-02-01 DIAGNOSIS — M79.2 RADICULAR PAIN OF RIGHT UPPER EXTREMITY: Primary | ICD-10-CM

## 2024-02-01 NOTE — PROGRESS NOTES
Erick reports discomfort on her R rhomboid.  She presents with a myofascial restriction along the medial aspect of her R rhomboid.  Provided accupressure,  t-spine/rib mobilization, c-spine mobilization.

## 2024-02-05 ENCOUNTER — ATHLETIC TRAINING (OUTPATIENT)
Dept: SPORTS MEDICINE | Facility: OTHER | Age: 19
End: 2024-02-05

## 2024-02-05 ENCOUNTER — CONSULT (OUTPATIENT)
Age: 19
End: 2024-02-05
Payer: COMMERCIAL

## 2024-02-05 VITALS
HEART RATE: 69 BPM | HEIGHT: 65 IN | WEIGHT: 131 LBS | SYSTOLIC BLOOD PRESSURE: 105 MMHG | DIASTOLIC BLOOD PRESSURE: 69 MMHG | BODY MASS INDEX: 21.83 KG/M2

## 2024-02-05 DIAGNOSIS — M79.2 RADICULAR PAIN OF RIGHT UPPER EXTREMITY: Primary | ICD-10-CM

## 2024-02-05 DIAGNOSIS — M79.673 FOOT ARCH PAIN, UNSPECIFIED LATERALITY: ICD-10-CM

## 2024-02-05 DIAGNOSIS — G89.4 CHRONIC PAIN SYNDROME: Primary | ICD-10-CM

## 2024-02-05 DIAGNOSIS — R20.2 NUMBNESS AND TINGLING OF BOTH UPPER EXTREMITIES: ICD-10-CM

## 2024-02-05 DIAGNOSIS — R20.0 NUMBNESS AND TINGLING OF BOTH UPPER EXTREMITIES: ICD-10-CM

## 2024-02-05 DIAGNOSIS — M54.2 CHRONIC NECK PAIN: ICD-10-CM

## 2024-02-05 DIAGNOSIS — M79.18 MYOFASCIAL PAIN SYNDROME: ICD-10-CM

## 2024-02-05 DIAGNOSIS — G89.29 CHRONIC NECK PAIN: ICD-10-CM

## 2024-02-05 DIAGNOSIS — M53.3 PAIN OF LEFT SACROILIAC JOINT: ICD-10-CM

## 2024-02-05 PROCEDURE — 99244 OFF/OP CNSLTJ NEW/EST MOD 40: CPT | Performed by: STUDENT IN AN ORGANIZED HEALTH CARE EDUCATION/TRAINING PROGRAM

## 2024-02-05 RX ORDER — LIDOCAINE 50 MG/G
OINTMENT TOPICAL AS NEEDED
Qty: 50 G | Refills: 0 | Status: SHIPPED | OUTPATIENT
Start: 2024-02-05

## 2024-02-05 NOTE — PATIENT INSTRUCTIONS
Trial lidocaine ointment over painful areas.  We discussed TENS unit as an option to help your symptoms.

## 2024-02-05 NOTE — PROGRESS NOTES
"Assessment:  1. Chronic pain syndrome    2. Chronic neck pain    3. Numbness and tingling of both upper extremities    4. Myofascial pain syndrome    5. Pain of left sacroiliac joint        Portions of the record may have been created with voice recognition software. Occasional wrong word or \"sound a like\" substitutions may have occurred due to the inherent limitations of voice recognition software. Read the chart carefully and recognize, using context, where substitutions have occurred. Contact me with any questions.       Plan:  18 y o f referred by Dr Walker, here for initial evaluation of chronic diffuse pain symptoms including neck pain w/ intermittent radiation into LUE > RUE, headaches, mid back pain and left sided low back pain. Symptoms wax and wane depending on level of activity. Neck and UE symptoms have been going on for about 4 months. She was evaluated by Dr Walker and underwent left shoulder and cervical spine MRI w/o significant findings. She has undergone several sessions of PT and continues exercises with  at school with limited improvement in symptoms. With regards to low back pain, symptoms are left sided, not associated with radicular pain or paresthesias. Notes current symptoms flared about 2 weeks ago without inciting event. She denies new or progressive weakness, saddle anesthesia, bbi, balance/gait issues. She recently started gabapentin 100 mg tid and notes significant relief with overall pain symptoms. Myofascial pain likely contributing to neck and UE symptoms. Sacroiliac joint pain likely contributing to left sided low back pain symptoms.    Recommend continuing gabapentin 100 mg tid as prescribed.     Script for topical lidocaine ointment provided for application over painful areas. Also discussed use of TENS unit for symptom management, notes she has had significant relief with this in the past.    Discussed option of sacroiliac joint injection for left sided low back pain. " "Educational material provided for further review.      History of Present Illness:    The patient is a 18 y.o. female who presents for consultation in regards to Neck Pain.  Symptoms have been present for several months. Symptoms began after injuring herself while powerlifting. Pain is reported to be 5 on the numeric rating scale.  Symptoms are felt nearly constantly and worst in the no typical pattern.  Symptoms are characterized as sharp, dull/aching, and numbing.  Symptoms are associated with no weakness.  Aggravating factors include UE activity, side lying.  Relieving factors include nothing.          Review of Systems:    Review of Systems   Musculoskeletal:  Positive for back pain, myalgias and neck pain.   All other systems reviewed and are negative.        History reviewed. No pertinent past medical history.    Past Surgical History:   Procedure Laterality Date    FL INJECTION LEFT SHOULDER (ARTHROGRAM)  11/1/2023       History reviewed. No pertinent family history.    Social History     Occupational History    Not on file   Tobacco Use    Smoking status: Never     Passive exposure: Never    Smokeless tobacco: Never   Vaping Use    Vaping status: Never Used   Substance and Sexual Activity    Alcohol use: Not Currently    Drug use: Never    Sexual activity: Not on file         Current Outpatient Medications:     gabapentin (NEURONTIN) 100 mg capsule, Take 1 capsule (100 mg total) by mouth 3 (three) times a day, Disp: 90 capsule, Rfl: 1    lidocaine (XYLOCAINE) 5 % ointment, Apply topically as needed for mild pain, Disp: 50 g, Rfl: 0    naproxen (Naprosyn) 500 mg tablet, Take 1 tablet (500 mg total) by mouth 2 (two) times a day as needed for mild pain, Disp: 40 tablet, Rfl: 1    No Known Allergies    Physical Exam:    /69   Pulse 69   Ht 5' 5\" (1.651 m)   Wt 59.4 kg (131 lb)   BMI 21.80 kg/m²     Constitutional: normal, well developed, well nourished, alert, in no distress and non-toxic and no overt " pain behavior.  Eyes: anicteric  HEENT: grossly intact  Neck: supple, symmetric, trachea midline and no masses   Pulmonary:even and unlabored  Cardiovascular:No edema or pitting edema present  Skin:Normal without rashes or lesions and well hydrated  Psychiatric:Mood and affect appropriate  Neurologic:Cranial Nerves II-XII grossly intact  Musculoskeletal:normal gait, appropriate neck ROM, intact strength and sensation to light touch over BUE, negative Spurling's, negative Jeffers's, SIJ manuevers: + L sacral compression, + L TRESSA, + L thigh thrust      Imaging      MRI ARTHROGRAM LEFT SHOULDER     INDICATION:   M25.512: Pain in left shoulder  S49.92XA: Unspecified injury of left shoulder and upper arm, initial encounter  M25.812: Other specified joint disorders, left shoulder.     COMPARISON: Correlation is made with the prior radiograph dated 10/14/2023.     TECHNIQUE:  Multiplanar/multisequence MR of the left shoulder was performed. Scan was performed after intraarticular injection of dilute gadolinium under fluoroscopic guidance into the joint.  Imaging performed on 1.5T MRI     FINDINGS:     SUBCUTANEOUS TISSUES: Injection related changes anteriorly.     JOINT CAPSULE: Intact, without inferior extravasation of contrast.     SUBACROMIAL/SUBDELTOID BURSA: There is trace loculated fluid in the subacromial/subdeltoid bursa noted.     ACROMION PROCESS: Normal.     ROTATOR CUFF: Intact.     LONG HEAD OF BICEPS TENDON: Normal.     GLENOID LABRUM: Intact.     GLENOHUMERAL JOINT: Intact. The glenohumeral ligaments are intact.     ACROMIOCLAVICULAR JOINT:  Normal.     BONES: Normal.     IMPRESSION:     No rotator cuff tear or discrete labral tear.     Trace loculated fluid noted within the subacromial/subpatellar bursa.        MRI CERVICAL SPINE WITHOUT CONTRAST     INDICATION: M54.2: Cervicalgia  G89.29: Other chronic pain  R20.0: Anesthesia of skin  R20.2: Paresthesia of skin.    19 yo F c/o chronic atraumatic neck and  Lt shoulder pain. c/o shooting pain/paresthesias of b/l UEs, worse on left compared to right. MRI arthrogram of left shoulder unremarkable. XR c-spine unremarkable. minimal relief   from conservative tx. please eval        COMPARISON: MRI arthrogram left shoulder 11/1/2023. Cervical spine radiograph 10/14/2023.     TECHNIQUE:  Multiplanar, multisequence imaging of the cervical spine was performed. .        IMAGE QUALITY:  Diagnostic     FINDINGS:     ALIGNMENT:  Normal alignment of the cervical spine.  No compression fracture.  No subluxation.  No scoliosis.     MARROW SIGNAL: Small T1 intraosseous vertebral body hemangioma. Otherwise, normal bone marrow signal.     LIGAMENTS: Intact.     CERVICAL AND VISUALIZED THORACIC CORD:  Normal signal within the visualized cord.     PREVERTEBRAL AND PARASPINAL SOFT TISSUES:  Normal.     VISUALIZED POSTERIOR FOSSA:  The visualized posterior fossa demonstrates no abnormal signal.     CERVICAL DISC SPACES: Normal disc height and signal.     C1-C2: Normal.     C2-C3:  Normal.     C3-C4:  Normal.     C4-C5:  Normal.     C5-C6:  Normal.     C6-C7:  Normal.     C7-T1:  Normal.     UPPER THORACIC DISC SPACES:  Normal.     OTHER FINDINGS:  None.     IMPRESSION:     Normal MRI of the cervical spine.

## 2024-02-06 NOTE — PROGRESS NOTES
My Marylin came in today with hand and foot pain as well as upper trap tightness.  We completed myofascial releases on her hand, IASTM on her arches and cupping for her upper traps.

## 2024-02-07 ENCOUNTER — ATHLETIC TRAINING (OUTPATIENT)
Dept: SPORTS MEDICINE | Facility: OTHER | Age: 19
End: 2024-02-07

## 2024-02-07 DIAGNOSIS — M79.673 FOOT ARCH PAIN, UNSPECIFIED LATERALITY: Primary | ICD-10-CM

## 2024-02-07 NOTE — PROGRESS NOTES
Athletic Training Progress Note    Name: Erick Baron  Age: 18 y.o.     Assessment/Plan:     Visit Diagnosis: Foot arch pain, unspecified laterality [M79.673]    Treatment Plan:     [x]  Follow-up PRN.   []  Follow-up prior to next practice/game for re-evaluation.  [x]  Daily treatment/rehab. Progress note expected weekly.     Subjective: Erick reports today to cont treatment on her lower extremities. She reported she received IASTM 2 days prior. She also reports having her best jump day so far yesterday in practice in regard to her ankle discomfort. She stated most of her discomfort has been in her arches and calves. Erick also asked what she can do for her hip discomfort. She reported being given a script at her Dr. Visit on Monday for lidocaine. She reports it helps not feel it. She also reports that her hip discomfort is better today than it was 2 wks prior and that our hip corrections have helped.     Objective:   See treatment log below    Treatment Log:     Date: 2/7/24   Playing Status: As Tolerated       Exercise/Treatment    Toe yoga 25x   Heel/Toe walking 4laps (reports some discomfort in achilles)   Post Tib heel raises 5x15    Airex balance hops 2-3sets of 30s   stretching    Hip correction    4-way ankle 3x15                   After discussing with Erick, during one of her next visits we should evaluate hip/score strength and stability to rule out muscular related pathology.

## 2024-02-08 ENCOUNTER — ATHLETIC TRAINING (OUTPATIENT)
Dept: SPORTS MEDICINE | Facility: OTHER | Age: 19
End: 2024-02-08

## 2024-02-08 DIAGNOSIS — M79.2 RADICULAR PAIN OF RIGHT UPPER EXTREMITY: Primary | ICD-10-CM

## 2024-02-08 NOTE — PROGRESS NOTES
Erick reports a tight L upper trap that creates radicular symptoms during certain positions and with certain activities.  Overall she is in good spirits and has been increasing her activity.  Continue addressing myofascial restrictions and strengthening.    HP  Stim to upper trap  Active Release to upper traps  Pelvis re-alignment.    EDU

## 2024-02-12 ENCOUNTER — ATHLETIC TRAINING (OUTPATIENT)
Dept: SPORTS MEDICINE | Facility: OTHER | Age: 19
End: 2024-02-12

## 2024-02-12 DIAGNOSIS — M79.2 RADICULAR PAIN OF RIGHT UPPER EXTREMITY: Primary | ICD-10-CM

## 2024-02-12 DIAGNOSIS — M79.673 FOOT ARCH PAIN, UNSPECIFIED LATERALITY: ICD-10-CM

## 2024-02-12 NOTE — PROGRESS NOTES
Athletic Training Progress Note    Name: Erick Baron  Age: 18 y.o.     Assessment/Plan:     Visit Diagnosis: No primary diagnosis found.    Treatment Plan:     [x]  Follow-up PRN.   []  Follow-up prior to next practice/game for re-evaluation.  [x]  Daily treatment/rehab. Progress note expected weekly.     Subjective: Erick reports today to cont treatment on her lower extremities. She reported she received IASTM 2 days prior. She also reports having her best jump day so far yesterday in practice in regard to her ankle discomfort. She stated most of her discomfort has been in her arches and calves. Erick also asked what she can do for her hip discomfort. She reported being given a script at her Dr. Visit on Monday for lidocaine. She reports it helps not feel it. She also reports that her hip discomfort is better today than it was 2 wks prior and that our hip corrections have helped.     Objective:   See treatment log below    Treatment Log:     Date: 2/7/24 2/12/24   Playing Status: As Tolerated         Exercise/Treatment     Toe yoga 25x    Heel/Toe walking 4 laps (reports some discomfort in achilles) 4 laps (shins are burning)   Post Tib heel raises 5x15  3x15   Airex balance hops 2-3sets of 30s 3 sets of 30s (same/switch cues)   Stretching      Hip correction  Sacrum adjustment   4-way ankle 3x15 3x10        Hand ultrasound  Continuous ultrasound 1.0 (bilateral)   IASTM (shins)  2 mins ea        After discussing with Erick, during one of her next visits we should evaluate hip/score strength and stability to rule out muscular related pathology.

## 2024-02-13 ENCOUNTER — ATHLETIC TRAINING (OUTPATIENT)
Dept: SPORTS MEDICINE | Facility: OTHER | Age: 19
End: 2024-02-13

## 2024-02-13 DIAGNOSIS — M79.2 RADICULAR PAIN OF RIGHT UPPER EXTREMITY: Primary | ICD-10-CM

## 2024-02-14 ENCOUNTER — ATHLETIC TRAINING (OUTPATIENT)
Dept: SPORTS MEDICINE | Facility: OTHER | Age: 19
End: 2024-02-14

## 2024-02-14 DIAGNOSIS — M79.673 FOOT ARCH PAIN, UNSPECIFIED LATERALITY: Primary | ICD-10-CM

## 2024-02-14 NOTE — PROGRESS NOTES
Athletic Training Progress Note    Name: Erick Baron  Age: 18 y.o.     Assessment/Plan:     Visit Diagnosis: Foot arch pain    Treatment Plan:     [x]  Follow-up PRN.   []  Follow-up prior to next practice/game for re-evaluation.  [x]  Daily treatment/rehab. Progress note expected weekly.     Subjective: Ath reports to [facility] to work on general lower extremity strengthening.     Objective:   Left anterior rotation fixed with mobilization     Treatment Log:     Date: 2/14/24 2/7/24 2/12/24 2/14/24   Playing Status: As tolerated  As Tolerated  As tolerated          Exercise/Treatment       3 way calf raises 3x10 each way      Heel taps  3x10      Lateral side steps (band around toes) 3x       Slide board towels scrunches 2x each foot       Toe yoga  25x     IASTM 3min B/L                She will cont to make appointments.

## 2024-02-14 NOTE — PROGRESS NOTES
Athletic Training Progress Note    Name: Erick Baron  Age: 18 y.o.     Assessment/Plan:     Visit Diagnosis: Radicular pain of right upper extremity [M79.2]    Treatment Plan:     [x]  Follow-up PRN.   []  Follow-up prior to next practice/game for re-evaluation.  [x]  Daily treatment/rehab. Progress note expected weekly.     Subjective: Erick reports today to cont treatment on her upper extremities. She states that she is starting to have slight tingling down her left arm again, but is still be managed by the gabapentin.    Objective:   See treatment log below    Treatment Log:     Date: 2/7/24 2/12/24 2/14/24   Playing Status: As Tolerated  As tolerated         Exercise/Treatment      Toe yoga 25x     Heel/Toe walking 4 laps (reports some discomfort in achilles) 4 laps (shins are burning)    Post Tib heel raises 5x15  3x15    Airex balance hops 2-3sets of 30s 3 sets of 30s (same/switch cues)    Stretching    completed   Hip correction  Sacrum adjustment    4-way ankle 3x15 3x10          Hand ultrasound  Continuous ultrasound 1.0 (bilateral)    IASTM (shins)  2 mins ea    Cervicle traction   completed   Scapula traction   completed                       She will cont to make appointments.

## 2024-02-15 ENCOUNTER — ATHLETIC TRAINING (OUTPATIENT)
Dept: SPORTS MEDICINE | Facility: OTHER | Age: 19
End: 2024-02-15

## 2024-02-15 DIAGNOSIS — M79.2 RADICULAR PAIN OF RIGHT UPPER EXTREMITY: Primary | ICD-10-CM

## 2024-02-15 NOTE — PROGRESS NOTES
Erick presents today with UE radicular symptoms with motion.  She has specific tightness R mid-trap/rhomboid/deep rotator.  She tolerated exercises well and responded well to MFR to her UE.    EDU

## 2024-02-19 ENCOUNTER — ATHLETIC TRAINING (OUTPATIENT)
Dept: SPORTS MEDICINE | Facility: OTHER | Age: 19
End: 2024-02-19

## 2024-02-19 DIAGNOSIS — M79.2 RADICULAR PAIN OF RIGHT UPPER EXTREMITY: Primary | ICD-10-CM

## 2024-02-19 NOTE — PROGRESS NOTES
Athletic Training Progress Note    Name: Erick Baron  Age: 18 y.o.     Assessment/Plan:     Visit Diagnosis: Foot arch pain    Treatment Plan:     [x]  Follow-up PRN.   []  Follow-up prior to next practice/game for re-evaluation.  [x]  Daily treatment/rehab. Progress note expected weekly.     Subjective: Ath reports to [facility] to work on general lower extremity strengthening.     Objective:   Left anterior rotation fixed with mobilization     Treatment Log:     Date: 2/14/24 2/7/24 2/19/24   Playing Status: As tolerated  As Tolerated As tolerated         Exercise/Treatment      3 way calf raises 3x10 each way Do not do!    Heel taps  3x10  3x10   Lateral side steps (band around toes) 3x   3x   Slide board towels scrunches 2x each foot      Toe yoga  25x    IASTM 3min B/L              2/19  Patient reports that she felt a pull and pain in her hamstring at her track meet Saturday. Pain was reported as a 7/10 right after the incident. Patient reports pain when walking 2/10, pain when sitting 0/10. She has previously strained her right hamstring 4 times within the past 5 years. No sounds heard at the time of the injury. Patient has been managing her injury with 500 mg Acetaminophen (right after competition), heated right calf and hamstring and did 30 nerve glides. ROM and strength were relatively equal but had pain with resisted leg curls, resisted straight leg hip extension (pain in the knee - sharp; and pain in the distal 1/2 of the right hamstring) and AROM knee flexion up to the chest. Patient experiences pain when going up the stairs - 4/10.       She will cont to make appointments.

## 2024-02-20 ENCOUNTER — ATHLETIC TRAINING (OUTPATIENT)
Dept: SPORTS MEDICINE | Facility: OTHER | Age: 19
End: 2024-02-20

## 2024-02-20 DIAGNOSIS — M79.673 FOOT ARCH PAIN, UNSPECIFIED LATERALITY: Primary | ICD-10-CM

## 2024-02-20 NOTE — PROGRESS NOTES
Mylina presents with B hip flexor pain R>L.  The pain has been decreasing since 2/17.  Continue modified workouts as pain is tolerated.    Cervical mobs

## 2024-02-26 ENCOUNTER — ATHLETIC TRAINING (OUTPATIENT)
Dept: SPORTS MEDICINE | Facility: OTHER | Age: 19
End: 2024-02-26

## 2024-02-26 DIAGNOSIS — M79.673 FOOT ARCH PAIN, UNSPECIFIED LATERALITY: Primary | ICD-10-CM

## 2024-02-26 NOTE — PROGRESS NOTES
Athletic Training Progress Note    Name: Erick Baron  Age: 18 y.o.     Assessment/Plan:     Visit Diagnosis: Foot arch pain    Treatment Plan:     [x]  Follow-up PRN.   []  Follow-up prior to next practice/game for re-evaluation.  [x]  Daily treatment/rehab. Progress note expected weekly.     Subjective: Ath reports to [facility] to work on general lower extremity strengthening.     Objective:   Left anterior rotation fixed with mobilization     Treatment Log:     Date: 2/14/24 2/7/24 2/19/24 2/26   Playing Status: As tolerated  As Tolerated As tolerated           Exercise/Treatment       3 way calf raises 3x10 each way Do not do!     Heel taps  3x10  3x10    Lateral side steps (band around toes) 3x   3x 3x   Slide board towels scrunches 2x each foot    2x each   Toe yoga  25x  25x   IASTM 3min B/L    3 mins B/L   Joint mobs    Great toe     2/19  Patient reports that she felt a pull and pain in her hamstring at her track meet Saturday. Pain was reported as a 7/10 right after the incident. Patient reports pain when walking 2/10, pain when sitting 0/10. She has previously strained her right hamstring 4 times within the past 5 years. No sounds heard at the time of the injury. Patient has been managing her injury with 500 mg Acetaminophen (right after competition), heated right calf and hamstring and did 30 nerve glides. ROM and strength were relatively equal but had pain with resisted leg curls, resisted straight leg hip extension (pain in the knee - sharp; and pain in the distal 1/2 of the right hamstring) and AROM knee flexion up to the chest. Patient experiences pain when going up the stairs - 4/10.       She will cont to make appointments.

## 2024-02-27 ENCOUNTER — ATHLETIC TRAINING (OUTPATIENT)
Dept: SPORTS MEDICINE | Facility: OTHER | Age: 19
End: 2024-02-27

## 2024-02-27 DIAGNOSIS — M79.2 RADICULAR PAIN OF RIGHT UPPER EXTREMITY: Primary | ICD-10-CM

## 2024-02-27 NOTE — PROGRESS NOTES
Erick reports with tightness & tension in her R>L upper traps.  Provided scapular exercises, ART, stretching.  Parafin for hand tension.    EDU

## 2024-02-28 ENCOUNTER — ATHLETIC TRAINING (OUTPATIENT)
Dept: SPORTS MEDICINE | Facility: OTHER | Age: 19
End: 2024-02-28

## 2024-02-28 DIAGNOSIS — M79.673 FOOT ARCH PAIN, UNSPECIFIED LATERALITY: ICD-10-CM

## 2024-02-28 DIAGNOSIS — M79.2 RADICULAR PAIN OF RIGHT UPPER EXTREMITY: Primary | ICD-10-CM

## 2024-02-28 NOTE — PROGRESS NOTES
Erick continues ankle/lower leg rehab and strengthening.  She tolerates all work very well.      Bike x 10 min, lunges w/pertubation, heel/toe walking, toe yoga, IASTM lower legs/feet.      US to B hands.    MW

## 2024-02-29 ENCOUNTER — TELEPHONE (OUTPATIENT)
Age: 19
End: 2024-02-29

## 2024-02-29 ENCOUNTER — ATHLETIC TRAINING (OUTPATIENT)
Dept: SPORTS MEDICINE | Facility: OTHER | Age: 19
End: 2024-02-29

## 2024-02-29 DIAGNOSIS — M79.673 FOOT ARCH PAIN, UNSPECIFIED LATERALITY: Primary | ICD-10-CM

## 2024-02-29 NOTE — TELEPHONE ENCOUNTER
Patient reached out to me in the [facility] training room on 2/26/2024 requesting that the neurology referral that I placed previously be faxed to a neurology group with [facility] in [facility] due to location convenience for patient.  This was coronary to through my office today and faxed.

## 2024-02-29 NOTE — PROGRESS NOTES
Erick continues ankle/lower leg rehab and strengthening.  She tolerates all work very well.    Bike x 10 min, lunges w/pertubation, heel/toe walking, toe yoga, IASTM lower legs/feet.       US to B hands.     MW    2/29:  Pts cancels their appointment today.  RADHA

## 2024-03-01 ENCOUNTER — ATHLETIC TRAINING (OUTPATIENT)
Dept: SPORTS MEDICINE | Facility: OTHER | Age: 19
End: 2024-03-01

## 2024-03-01 DIAGNOSIS — M79.673 FOOT ARCH PAIN, UNSPECIFIED LATERALITY: Primary | ICD-10-CM

## 2024-03-01 NOTE — PROGRESS NOTES
Erick performed LL/ankle strengthening exercises.  Taped B arch/ankle for practice.  Performed ART to upper traps.  Cervical mobs performed.  Tolerated well.    EDU

## 2024-03-05 ENCOUNTER — ATHLETIC TRAINING (OUTPATIENT)
Dept: SPORTS MEDICINE | Facility: OTHER | Age: 19
End: 2024-03-05

## 2024-03-05 DIAGNOSIS — M79.673 FOOT ARCH PAIN, UNSPECIFIED LATERALITY: Primary | ICD-10-CM

## 2024-03-06 NOTE — PROGRESS NOTES
Erick performed LL/ankle strengthening exercises.  Taped B arch/ankle for practice.  Performed ART to upper traps.  Cervical mobs performed.  Tolerated well.     MW    3/5:  Completed IASTM for her arches. They were taped for practice.  RADHA

## 2024-03-07 ENCOUNTER — ATHLETIC TRAINING (OUTPATIENT)
Dept: SPORTS MEDICINE | Facility: OTHER | Age: 19
End: 2024-03-07

## 2024-03-07 DIAGNOSIS — M79.2 RADICULAR PAIN OF RIGHT UPPER EXTREMITY: Primary | ICD-10-CM

## 2024-03-07 NOTE — PROGRESS NOTES
Erick reports increased tension in her R upper trap.  HP x 10 min,  Cervical mobs, ART.  Cupping B quads.  L pelvic upslip.  Reset pelvis.    MW

## 2024-03-08 DIAGNOSIS — R20.2 NUMBNESS AND TINGLING OF BOTH UPPER EXTREMITIES: ICD-10-CM

## 2024-03-08 DIAGNOSIS — M79.2 RADICULAR PAIN OF LEFT UPPER EXTREMITY: ICD-10-CM

## 2024-03-08 DIAGNOSIS — G89.29 CHRONIC NECK PAIN: ICD-10-CM

## 2024-03-08 DIAGNOSIS — M54.2 CHRONIC NECK PAIN: ICD-10-CM

## 2024-03-08 DIAGNOSIS — M79.2 RADICULAR PAIN OF RIGHT UPPER EXTREMITY: ICD-10-CM

## 2024-03-08 DIAGNOSIS — R20.0 NUMBNESS AND TINGLING OF BOTH UPPER EXTREMITIES: ICD-10-CM

## 2024-03-08 RX ORDER — GABAPENTIN 100 MG/1
100 CAPSULE ORAL 3 TIMES DAILY
Qty: 270 CAPSULE | Refills: 1 | Status: SHIPPED | OUTPATIENT
Start: 2024-03-08

## 2024-03-12 ENCOUNTER — ATHLETIC TRAINING (OUTPATIENT)
Dept: SPORTS MEDICINE | Facility: OTHER | Age: 19
End: 2024-03-12

## 2024-03-12 DIAGNOSIS — M79.673 FOOT ARCH PAIN, UNSPECIFIED LATERALITY: Primary | ICD-10-CM

## 2024-03-14 NOTE — PROGRESS NOTES
Erick performed LL/ankle strengthening exercises.  Taped B arch/ankle for practice.  Performed ART to upper traps.  Cervical mobs performed.  Tolerated well.     MW     3/5:  Completed IASTM for her arches. They were taped for practice.  RADHA    3/12:  Pt completed CUS on arches this evening. She completed her routine arch exercises during her rehabilitation session.  RADHA

## 2024-03-19 ENCOUNTER — ATHLETIC TRAINING (OUTPATIENT)
Dept: SPORTS MEDICINE | Facility: OTHER | Age: 19
End: 2024-03-19

## 2024-03-19 DIAGNOSIS — M79.673 FOOT ARCH PAIN, UNSPECIFIED LATERALITY: Primary | ICD-10-CM

## 2024-03-20 ENCOUNTER — ATHLETIC TRAINING (OUTPATIENT)
Dept: SPORTS MEDICINE | Facility: OTHER | Age: 19
End: 2024-03-20

## 2024-03-20 DIAGNOSIS — M79.2 RADICULAR PAIN OF RIGHT UPPER EXTREMITY: Primary | ICD-10-CM

## 2024-03-20 NOTE — PROGRESS NOTES
Erick performed LL/ankle strengthening exercises.  Taped B arch/ankle for practice.  Performed ART to upper traps.  Cervical mobs performed.  Tolerated well.     MW     3/5:  Completed IASTM for her arches. They were taped for practice.  CM     3/12:  Pt completed CUS on arches this evening. She completed her routine arch exercises during her rehabilitation session.  RADHA    3/19:  Completed IASTM on lower extremities to prepare for jump day this evening.  RADHA

## 2024-03-20 NOTE — PROGRESS NOTES
3/20  Pt states that it is her upper extremity day. Completed MHP, tennis ball bounce, overhead press, and ER.  LB ATC    3/19  Mylinmar performed LL/ankle strengthening exercises.  Taped B arch/ankle for practice.  Performed ART to upper traps.  Cervical mobs performed.  Tolerated well.     MW     3/5:  Completed IASTM for her arches. They were taped for practice.  CM     3/12:  Pt completed CUS on arches this evening. She completed her routine arch exercises during her rehabilitation session.  RADHA    3/19:  Completed IASTM on lower extremities to prepare for jump day this evening.  RADHA

## 2024-03-22 ENCOUNTER — ATHLETIC TRAINING (OUTPATIENT)
Dept: SPORTS MEDICINE | Facility: OTHER | Age: 19
End: 2024-03-22

## 2024-03-22 DIAGNOSIS — M79.2 RADICULAR PAIN OF RIGHT UPPER EXTREMITY: Primary | ICD-10-CM

## 2024-03-22 NOTE — PROGRESS NOTES
Erick reports in good spirits with minimal c/o pain.  Radicular symptoms persist primarily L side>R side.  She expressed an increase in headache intensity over the past couple weeks.    HP x 10 min  Prone pivots x 25  Cervical mobs & ART  Scapular stretch    Tolerated well    MW

## 2024-03-27 ENCOUNTER — ATHLETIC TRAINING (OUTPATIENT)
Dept: SPORTS MEDICINE | Facility: OTHER | Age: 19
End: 2024-03-27

## 2024-03-27 DIAGNOSIS — M79.2 RADICULAR PAIN OF RIGHT UPPER EXTREMITY: Primary | ICD-10-CM

## 2024-03-27 NOTE — PROGRESS NOTES
Erick presents today pre-event.  She is in good spirits and has minimal reports of UE discomfort.    HP x 10  Cervical mobs, occipital release  ART upper traps    Tolerated well    MW

## 2024-04-02 ENCOUNTER — ATHLETIC TRAINING (OUTPATIENT)
Dept: SPORTS MEDICINE | Facility: OTHER | Age: 19
End: 2024-04-02

## 2024-04-02 DIAGNOSIS — M79.2 RADICULAR PAIN OF RIGHT UPPER EXTREMITY: Primary | ICD-10-CM

## 2024-04-02 NOTE — PROGRESS NOTES
Mylina presents with continued radicular symptoms on B upper extremity as well as her face.  She reports tinnitus in her R ear.    HP x 10 min,  Cervicle mobs  ART  Cupping    Tolerated well    MW

## 2024-04-04 ENCOUNTER — ATHLETIC TRAINING (OUTPATIENT)
Dept: SPORTS MEDICINE | Facility: OTHER | Age: 19
End: 2024-04-04

## 2024-04-04 DIAGNOSIS — M79.673 FOOT ARCH PAIN, UNSPECIFIED LATERALITY: ICD-10-CM

## 2024-04-04 DIAGNOSIS — M79.2 RADICULAR PAIN OF RIGHT UPPER EXTREMITY: Primary | ICD-10-CM

## 2024-04-04 NOTE — PROGRESS NOTES
Mylina presents with increased radicular symptoms L>R UE and into her neck and head.  This has not impeded workouts.  She reports increased course work this week.    HP x 10 min x 2  Cupping to Upper Trap  Cervical mobs  ART to upper trap region & TMJ    IASTM B feet    Tolerated well    MW

## 2024-04-10 ENCOUNTER — ATHLETIC TRAINING (OUTPATIENT)
Dept: SPORTS MEDICINE | Facility: OTHER | Age: 19
End: 2024-04-10

## 2024-04-10 DIAGNOSIS — M79.2 RADICULAR PAIN OF RIGHT UPPER EXTREMITY: Primary | ICD-10-CM

## 2024-04-10 NOTE — PROGRESS NOTES
Erick presents with B anterior shoulder pain.  She accounts it to a vigorous arm swing during broad jumps yesterday.    Hp  Prone pivots  Push plus  Rows  Pec minor stretch  Cervical mobs    MW

## 2024-04-14 DIAGNOSIS — M79.2 RADICULAR PAIN OF LEFT UPPER EXTREMITY: ICD-10-CM

## 2024-04-14 DIAGNOSIS — R20.2 NUMBNESS AND TINGLING OF BOTH UPPER EXTREMITIES: ICD-10-CM

## 2024-04-14 DIAGNOSIS — M79.2 RADICULAR PAIN OF RIGHT UPPER EXTREMITY: ICD-10-CM

## 2024-04-14 DIAGNOSIS — M54.2 CHRONIC NECK PAIN: ICD-10-CM

## 2024-04-14 DIAGNOSIS — G89.29 CHRONIC NECK PAIN: ICD-10-CM

## 2024-04-14 DIAGNOSIS — R20.0 NUMBNESS AND TINGLING OF BOTH UPPER EXTREMITIES: ICD-10-CM

## 2024-04-15 ENCOUNTER — ATHLETIC TRAINING (OUTPATIENT)
Dept: SPORTS MEDICINE | Facility: OTHER | Age: 19
End: 2024-04-15

## 2024-04-15 DIAGNOSIS — M62.89 TIGHT FASCIA: Primary | ICD-10-CM

## 2024-04-15 RX ORDER — GABAPENTIN 100 MG/1
100 CAPSULE ORAL 3 TIMES DAILY
Qty: 270 CAPSULE | Refills: 1 | Status: SHIPPED | OUTPATIENT
Start: 2024-04-15

## 2024-04-15 NOTE — PROGRESS NOTES
Erick reports with soreness in B quads after this weekends meet.    Bike warm up  Cupping  Stretching

## 2024-04-16 ENCOUNTER — ATHLETIC TRAINING (OUTPATIENT)
Dept: SPORTS MEDICINE | Facility: OTHER | Age: 19
End: 2024-04-16

## 2024-04-16 DIAGNOSIS — S96.911A STRAIN OF RIGHT ANKLE, INITIAL ENCOUNTER: Primary | ICD-10-CM

## 2024-04-16 NOTE — PROGRESS NOTES
"Athletic Training Foot/Ankle Evaluation    Name: Erick Baron  Age: 18 y.o.   School District: [employer]  Sport: Track and Field   Date of Assessment: 4/16/2024    Assessment/Plan:     Visit Diagnosis: Strain of right ankle, initial encounter [B03.672I]    Treatment Plan:     [x]  Follow-up PRN.   []  Follow-up prior to next practice/game for re-evaluation.  [x]  Daily treatment/rehab. Progress note expected weekly.     Referral:     []  Not needed at this time  []  Referred to:     [x]  Coaching staff notified  []  Parent/Guardian Notified    Subjective:    Date of Injury: 4/16/24    Injury occurred during:     []  Practice  []  Competition  []  Other: Walking down the stairs    Mechanism: Rolling ankle inwards, states it felt like their ankle reached 90 degrees    Previous History: Multiple ankle \"twists\"    Reported Symptoms:     [] Felt pop [x] Weakness   [] Cracking or snapping [] Grinding   [x] Twisted [] Sharp pain   [] Pain with rest [] Burning   [x] Pain with activity [] Dull or achy   [] Pain with stairs [] Felt give way   [] Numbness or tingling [] Loss of motion     Objective:    Observation:     [x]  No observable findings compared bilaterally    [] Swelling [] Callous or blister   [] Ecchymosis [] Nail abnormality   [] Redness [] Ingrown nail   [] Deformity [] Bunion formation   [] Abnormal gait [] Pes planus   [] Pitting edema [] Pes cavus   [] Open wound [] Atrophy     Palpation: TTP behind lateral malleolus     Active Range of Motion:      Full  ROM Limited  ROM Pain  with  ROM No  Motion   Dorsiflexion [] [x] [x] []   Plantarflexion [] [] [] []   Inversion [] [] [] []   Eversion [] [] [] []   Great Toe Flexion [] [] [] []   Great Toe Extension [] [] [] []   Toe Flexion [] [] [] []   Toe Extension [] [] [] []     Manual Muscle Tests:     Not performed [x]             5 4+ 4 4- 3 or  Under   Dorsiflexion [] [] [] [] []   Plantarflexion [] [] [] [] []   Inversion [] [] [] [] []   Eversion " [] [] [] [] []   Great Toe Flexion [] [] [] [] []   Great Toe Extension [] [] [] [] []   Toe Flexion [] [] [] [] []   Toe Extension [] [] [] [] []     Special Tests:      (+)  Laxity (+)  Pain (-)  WNL Not  Tested   Bump [] [] [x] []   Squeeze [] [] [x] []   Percussion [] [] [] []   Tuning Fork [] [] [] []   Anterior Drawer [] [] [x] []   Posterior Drawer [] [] [x] []   Talar Tilt - Inversion [] [x] [] []   Talar Tilt - Eversion [] [] [] []   Kleiger [] [] [] []   Toe Compression [] [] [] []   Toe Distraction [] [] [] []   MTP Valgus [] [] [] []   MTP Varus [] [] [] []   Intermetatarsal Glide [] [] [] []   Tarsometatarsal Glide [] [] [] []   Tinel's [] [] [] []   Impingement Sign [] [] [] []   Oseguera's (Achilles) [] [] [] []   Mary's Sign (DVT) [] [] [] []   Interdigital Neuroma [] [] [] []   Navicular Drop [] [] [] []     Treatment Log:  Pt was told to not participate in their sprinting practice this evening. They were told to come back tomorrow but most likely we will aim to participate in practice on Thursday. We are preparing for their meet next weekend.    Date:    Playing Status:        Exercise/Treatment

## 2024-04-23 ENCOUNTER — ATHLETIC TRAINING (OUTPATIENT)
Dept: SPORTS MEDICINE | Facility: OTHER | Age: 19
End: 2024-04-23

## 2024-04-23 DIAGNOSIS — S96.911A STRAIN OF RIGHT ANKLE, INITIAL ENCOUNTER: Primary | ICD-10-CM

## 2024-04-24 ENCOUNTER — ATHLETIC TRAINING (OUTPATIENT)
Dept: SPORTS MEDICINE | Facility: OTHER | Age: 19
End: 2024-04-24

## 2024-04-24 DIAGNOSIS — S96.911A STRAIN OF RIGHT ANKLE, INITIAL ENCOUNTER: Primary | ICD-10-CM

## 2024-04-24 NOTE — PROGRESS NOTES
"Athletic Training Foot/Ankle Evaluation     Name: Erick Baron  Age: 18 y.o.   School District: [facility] [facility]  Sport: Track and Field   Date of Assessment: 4/16/2024     Assessment/Plan:      Visit Diagnosis: Strain of right ankle, initial encounter [S90.592T]     Treatment Plan:      [x]  Follow-up PRN.   []  Follow-up prior to next practice/game for re-evaluation.  [x]  Daily treatment/rehab. Progress note expected weekly.      Referral:      []  Not needed at this time  []  Referred to:      [x]  Coaching staff notified  []  Parent/Guardian Notified     Subjective:     Date of Injury: 4/16/24     Injury occurred during:      []  Practice  []  Competition  []  Other: Walking down the stairs     Mechanism: Rolling ankle inwards, states it felt like their ankle reached 90 degrees     Previous History: Multiple ankle \"twists\"     Reported Symptoms:      []  Felt pop [x]  Weakness   []  Cracking or snapping []  Grinding   [x]  Twisted []  Sharp pain   []  Pain with rest []  Burning   [x]  Pain with activity []  Dull or achy   []  Pain with stairs []  Felt give way   []  Numbness or tingling []  Loss of motion      Objective:     Observation:      [x]  No observable findings compared bilaterally     []  Swelling []  Callous or blister   []  Ecchymosis []  Nail abnormality   []  Redness []  Ingrown nail   []  Deformity []  Bunion formation   []  Abnormal gait []  Pes planus   []  Pitting edema []  Pes cavus   []  Open wound []  Atrophy      Palpation: TTP behind lateral malleolus      Active Range of Motion:        Full  ROM Limited  ROM Pain  with  ROM No  Motion   Dorsiflexion []  [x]  [x]  []    Plantarflexion []  []  []  []    Inversion []  []  []  []    Eversion []  []  []  []    Great Toe Flexion []  []  []  []    Great Toe Extension []  []  []  []    Toe Flexion []  []  []  []    Toe Extension []  []  []  []       Manual Muscle Tests:              Not performed [x]                      5 4+ 4 4- 3 " "or  Under   Dorsiflexion []  []  []  []  []    Plantarflexion []  []  []  []  []    Inversion []  []  []  []  []    Eversion []  []  []  []  []    Great Toe Flexion []  []  []  []  []    Great Toe Extension []  []  []  []  []    Toe Flexion []  []  []  []  []    Toe Extension []  []  []  []  []       Special Tests:        (+)  Laxity (+)  Pain (-)  WNL Not  Tested   Bump []  []  [x]  []    Squeeze []  []  [x]  []    Percussion []  []  []  []    Tuning Fork []  []  []  []    Anterior Drawer []  []  [x]  []    Posterior Drawer []  []  [x]  []    Talar Tilt - Inversion []  [x]  []  []    Talar Tilt - Eversion []  []  []  []    Kleiger []  []  []  []    Toe Compression []  []  []  []    Toe Distraction []  []  []  []    MTP Valgus []  []  []  []    MTP Varus []  []  []  []    Intermetatarsal Glide []  []  []  []    Tarsometatarsal Glide []  []  []  []    Tinel's []  []  []  []    Impingement Sign []  []  []  []    Oseguera's (Achilles) []  []  []  []    Mary's Sign (DVT) []  []  []  []    Interdigital Neuroma []  []  []  []    Navicular Drop []  []  []  []       Treatment Log:  Pt was told to not participate in their sprinting practice this evening. They were told to come back tomorrow but most likely we will aim to participate in practice on Thursday. We are preparing for their meet next weekend.      4/23:  Today we progressed [employer] with bounds and long jumps. Erick was instructed to build up to 100% through practice this afternoon in preparation to compete over the weekend. Erick was taped for practice. Erick also requested IASTM \"between their shoulder blades\"  CM  Date:     Playing Status:           Exercise/Treatment                                                                          "

## 2024-04-25 NOTE — PROGRESS NOTES
"Athletic Training Foot/Ankle Evaluation     Name: Erick Baron  Age: 18 y.o.   School District: [employer] [facility]  Sport: Track and Field   Date of Assessment: 4/16/2024     Assessment/Plan:      Visit Diagnosis: Strain of right ankle, initial encounter [S93.951A]     Treatment Plan:      [x]  Follow-up PRN.   []  Follow-up prior to next practice/game for re-evaluation.  [x]  Daily treatment/rehab. Progress note expected weekly.      Referral:      []  Not needed at this time  []  Referred to:      [x]  Coaching staff notified  []  Parent/Guardian Notified     Subjective:     Date of Injury: 4/16/24     Injury occurred during:      []  Practice  []  Competition  []  Other: Walking down the stairs     Mechanism: Rolling ankle inwards, states it felt like their ankle reached 90 degrees     Previous History: Multiple ankle \"twists\"     Reported Symptoms:      []  Felt pop [x]  Weakness   []  Cracking or snapping []  Grinding   [x]  Twisted []  Sharp pain   []  Pain with rest []  Burning   [x]  Pain with activity []  Dull or achy   []  Pain with stairs []  Felt give way   []  Numbness or tingling []  Loss of motion      Objective:     Observation:      [x]  No observable findings compared bilaterally     []  Swelling []  Callous or blister   []  Ecchymosis []  Nail abnormality   []  Redness []  Ingrown nail   []  Deformity []  Bunion formation   []  Abnormal gait []  Pes planus   []  Pitting edema []  Pes cavus   []  Open wound []  Atrophy      Palpation: TTP behind lateral malleolus      Active Range of Motion:        Full  ROM Limited  ROM Pain  with  ROM No  Motion   Dorsiflexion []  [x]  [x]  []    Plantarflexion []  []  []  []    Inversion []  []  []  []    Eversion []  []  []  []    Great Toe Flexion []  []  []  []    Great Toe Extension []  []  []  []    Toe Flexion []  []  []  []    Toe Extension []  []  []  []       Manual Muscle Tests:              Not performed [x]                      5 4+ 4 4- 3 " "or  Under   Dorsiflexion []  []  []  []  []    Plantarflexion []  []  []  []  []    Inversion []  []  []  []  []    Eversion []  []  []  []  []    Great Toe Flexion []  []  []  []  []    Great Toe Extension []  []  []  []  []    Toe Flexion []  []  []  []  []    Toe Extension []  []  []  []  []       Special Tests:        (+)  Laxity (+)  Pain (-)  WNL Not  Tested   Bump []  []  [x]  []    Squeeze []  []  [x]  []    Percussion []  []  []  []    Tuning Fork []  []  []  []    Anterior Drawer []  []  [x]  []    Posterior Drawer []  []  [x]  []    Talar Tilt - Inversion []  [x]  []  []    Talar Tilt - Eversion []  []  []  []    Kleiger []  []  []  []    Toe Compression []  []  []  []    Toe Distraction []  []  []  []    MTP Valgus []  []  []  []    MTP Varus []  []  []  []    Intermetatarsal Glide []  []  []  []    Tarsometatarsal Glide []  []  []  []    Tinel's []  []  []  []    Impingement Sign []  []  []  []    Oseguera's (Achilles) []  []  []  []    Mary's Sign (DVT) []  []  []  []    Interdigital Neuroma []  []  []  []    Navicular Drop []  []  []  []       Treatment Log:  Pt was told to not participate in their sprinting practice this evening. They were told to come back tomorrow but most likely we will aim to participate in practice on Thursday. We are preparing for their meet next weekend.      4/23:  Today we progressed [employer] with bounds and long jumps. Erick was instructed to build up to 100% through practice this afternoon in preparation to compete over the weekend. Erick was taped for practice. Erick also requested IASTM \"between their shoulder blades\"  CM  Date:     Playing Status:           Exercise/Treatment                                                                          4/24  Completed ankle exercises then muscle releases for low back and upper back. Taped for practice.  LB ATC  "

## 2024-04-26 ENCOUNTER — ATHLETIC TRAINING (OUTPATIENT)
Dept: SPORTS MEDICINE | Facility: OTHER | Age: 19
End: 2024-04-26

## 2024-04-26 DIAGNOSIS — M62.89 TIGHT FASCIA: Primary | ICD-10-CM

## 2024-04-26 NOTE — PROGRESS NOTES
Erick presents with tightness in her low back and in her L pec.    HP x 10 min  Cupping  MFR    Tolerated well    MW

## 2024-04-29 ENCOUNTER — ATHLETIC TRAINING (OUTPATIENT)
Dept: SPORTS MEDICINE | Facility: OTHER | Age: 19
End: 2024-04-29

## 2024-04-29 DIAGNOSIS — M62.89 TIGHT FASCIA: Primary | ICD-10-CM

## 2024-04-29 NOTE — PROGRESS NOTES
Erick presents with bilateral low back pain.  She is affected by seasonal allergies and has been sneezing quite a bit.  She also reports L upper trap tension.    HP x 10 min  Low back stretching  MMR cervical spine/upper trap.    MW

## 2024-04-30 ENCOUNTER — ATHLETIC TRAINING (OUTPATIENT)
Dept: SPORTS MEDICINE | Facility: OTHER | Age: 19
End: 2024-04-30

## 2024-04-30 DIAGNOSIS — M62.89 TIGHT FASCIA: Primary | ICD-10-CM

## 2024-04-30 NOTE — PROGRESS NOTES
4/30  Patient presents with bilateral low back pain and left hip pain. She also reports L shoulder tension specifically in the upper trap region. Patient has been suffering from seasonal allergies and has been frustrated with that and the amount of pain she is in (low back and hip, left shoulder and left shin).     Completed: hip distraction, Lateral glide, paraspinal manual release, muscle stretch, heat pack for B/L shoulder pain, and ice cup for shin pain.   RM, ATS  BD, ATC      4/29  Erick presents with bilateral low back pain.  She is affected by seasonal allergies and has been sneezing quite a bit.  She also reports L upper trap tension.    HP x 10 min  Low back stretching  MMR cervical spine/upper trap.    MW

## 2024-05-02 ENCOUNTER — ATHLETIC TRAINING (OUTPATIENT)
Dept: SPORTS MEDICINE | Facility: OTHER | Age: 19
End: 2024-05-02

## 2024-05-02 DIAGNOSIS — M62.89 TIGHT FASCIA: Primary | ICD-10-CM

## 2024-05-02 NOTE — PROGRESS NOTES
5/2/24  Patient presents with bilateral tightness of the calves and shins. She also reports discomfort at the Achilles tendon bilaterally. Patient has been suffering from seasonal allergies and is not looking forward to the location of her meet tomorrow due to past issues with allergies being heightened at [place].     Completed: heat for shins and calves (bilateral), gastrocnemius stretching 3x30 seconds, IASTM on calves and shins, and gliding cupping for the calves and shins. Lateral side bending stretches 2x30 seconds, hip adductions 2x30 seconds, posterior sacral rotation correction, and paraspinal trigger points and stretching 3x30 seconds.   CY LAT ATC    4/30  Patient presents with bilateral low back pain and left hip pain. She also reports L shoulder tension specifically in the upper trap region. Patient has been suffering from seasonal allergies and has been frustrated with that and the amount of pain she is in (low back and hip, left shoulder and left shin).     Completed: hip distraction, Lateral glide, paraspinal manual release, muscle stretch, heat pack for B/L shoulder pain, and ice cup for shin pain.   RM, ATS  BD, ATC      4/29  Erick presents with bilateral low back pain.  She is affected by seasonal allergies and has been sneezing quite a bit.  She also reports L upper trap tension.    HP x 10 min  Low back stretching  MMR cervical spine/upper trap.    MW

## 2024-05-06 ENCOUNTER — ATHLETIC TRAINING (OUTPATIENT)
Dept: SPORTS MEDICINE | Facility: OTHER | Age: 19
End: 2024-05-06

## 2024-05-06 DIAGNOSIS — R06.4 HYPERVENTILATION: Primary | ICD-10-CM

## 2024-05-07 ENCOUNTER — ATHLETIC TRAINING (OUTPATIENT)
Dept: SPORTS MEDICINE | Facility: OTHER | Age: 19
End: 2024-05-07

## 2024-05-07 DIAGNOSIS — M62.89 TIGHT FASCIA: ICD-10-CM

## 2024-05-07 DIAGNOSIS — R06.4 HYPERVENTILATION: Primary | ICD-10-CM

## 2024-05-07 NOTE — PROGRESS NOTES
"5/6:  Completed TENS electrical stimulation and heat to reduce pain discomfort. States that it is easier to breath after completing it.  CM    5/6  Pt states that she has another hyperventilation episode during the last event on Saturday. She has had continued tightness in chest following this event and pain in her mid to lower back. It is important that we treat this hyperventilation as another concern and as a related symptom to the other issues that have occurred this year. Try to take \"everlasting\" breaths.  LB ATC     5/2/24  Patient presents with bilateral tightness of the calves and shins. She also reports discomfort at the Achilles tendon bilaterally. Patient has been suffering from seasonal allergies and is not looking forward to the location of her meet tomorrow due to past issues with allergies being heightened at [place].      Completed: heat for shins and calves (bilateral), gastrocnemius stretching 3x30 seconds, IASTM on calves and shins, and gliding cupping for the calves and shins. Lateral side bending stretches 2x30 seconds, hip adductions 2x30 seconds, posterior sacral rotation correction, and paraspinal trigger points and stretching 3x30 seconds.   CY LAT ATC     4/30  Patient presents with bilateral low back pain and left hip pain. She also reports L shoulder tension specifically in the upper trap region. Patient has been suffering from seasonal allergies and has been frustrated with that and the amount of pain she is in (low back and hip, left shoulder and left shin).      Completed: hip distraction, Lateral glide, paraspinal manual release, muscle stretch, heat pack for B/L shoulder pain, and ice cup for shin pain.   RM, ATS  BD, ATC        4/29  Erick presents with bilateral low back pain.  She is affected by seasonal allergies and has been sneezing quite a bit.  She also reports L upper trap tension.     HP x 10 min  Low back stretching  MMR cervical spine/upper trap.     MW  "

## 2024-05-07 NOTE — PROGRESS NOTES
"5/7:  Pt states the TENS and heat yesterday helped her reduce her discomfort, Pt is TTP along extensor muscles B/L along T3-L1. Pt mentioned she had neural pain like she never felt before during her hyperventilation episode on Saturday. The neural pain followed along C8/T1 dermatome. Current plan is to treat muscularly.    Completed TENS electrical stimulation and heat to relax muscles of mid to low back and cupping on upper trap B/L.  CY LAT ATC    5/6:  Completed TENS electrical stimulation and heat to reduce pain discomfort. States that it is easier to breath after completing it.  CM    5/6  Pt states that she has another hyperventilation episode during the last event on Saturday. She has had continued tightness in chest following this event and pain in her mid to lower back. It is important that we treat this hyperventilation as another concern and as a related symptom to the other issues that have occurred this year. Try to take \"everlasting\" breaths.  LB ATC     5/2/24  Patient presents with bilateral tightness of the calves and shins. She also reports discomfort at the Achilles tendon bilaterally. Patient has been suffering from seasonal allergies and is not looking forward to the location of her meet tomorrow due to past issues with allergies being heightened at [place].      Completed: heat for shins and calves (bilateral), gastrocnemius stretching 3x30 seconds, IASTM on calves and shins, and gliding cupping for the calves and shins. Lateral side bending stretches 2x30 seconds, hip adductions 2x30 seconds, posterior sacral rotation correction, and paraspinal trigger points and stretching 3x30 seconds.   CY LAT ATC     4/30  Patient presents with bilateral low back pain and left hip pain. She also reports L shoulder tension specifically in the upper trap region. Patient has been suffering from seasonal allergies and has been frustrated with that and the amount of pain she is in (low back and hip, left " shoulder and left shin).      Completed: hip distraction, Lateral glide, paraspinal manual release, muscle stretch, heat pack for B/L shoulder pain, and ice cup for shin pain.   RM, ATS  BD, ATC        4/29  Myric presents with bilateral low back pain.  She is affected by seasonal allergies and has been sneezing quite a bit.  She also reports L upper trap tension.     HP x 10 min  Low back stretching  MMR cervical spine/upper trap.     MW

## 2024-05-07 NOTE — PROGRESS NOTES
"5/6  Pt states that she has another hyperventilation episode during the last event on Saturday. She has had continued tightness in chest following this event and pain in her mid to lower back. It is important that we treat this hyperventilation as another concern and as a related symptom to the other issues that have occurred this year. Try to take \"everlasting\" breaths.  LB ATC     5/2/24  Patient presents with bilateral tightness of the calves and shins. She also reports discomfort at the Achilles tendon bilaterally. Patient has been suffering from seasonal allergies and is not looking forward to the location of her meet tomorrow due to past issues with allergies being heightened at [place].     Completed: heat for shins and calves (bilateral), gastrocnemius stretching 3x30 seconds, IASTM on calves and shins, and gliding cupping for the calves and shins. Lateral side bending stretches 2x30 seconds, hip adductions 2x30 seconds, posterior sacral rotation correction, and paraspinal trigger points and stretching 3x30 seconds.   CY LAT ATC    4/30  Patient presents with bilateral low back pain and left hip pain. She also reports L shoulder tension specifically in the upper trap region. Patient has been suffering from seasonal allergies and has been frustrated with that and the amount of pain she is in (low back and hip, left shoulder and left shin).     Completed: hip distraction, Lateral glide, paraspinal manual release, muscle stretch, heat pack for B/L shoulder pain, and ice cup for shin pain.   RM, ATS  BD, ATC      4/29  Erick presents with bilateral low back pain.  She is affected by seasonal allergies and has been sneezing quite a bit.  She also reports L upper trap tension.    HP x 10 min  Low back stretching  MMR cervical spine/upper trap.    MW  "

## 2024-05-17 DIAGNOSIS — M79.2 RADICULAR PAIN OF RIGHT UPPER EXTREMITY: ICD-10-CM

## 2024-05-17 DIAGNOSIS — R20.0 NUMBNESS AND TINGLING OF BOTH UPPER EXTREMITIES: ICD-10-CM

## 2024-05-17 DIAGNOSIS — M54.2 CHRONIC NECK PAIN: ICD-10-CM

## 2024-05-17 DIAGNOSIS — R20.2 NUMBNESS AND TINGLING OF BOTH UPPER EXTREMITIES: ICD-10-CM

## 2024-05-17 DIAGNOSIS — M79.2 RADICULAR PAIN OF LEFT UPPER EXTREMITY: ICD-10-CM

## 2024-05-17 DIAGNOSIS — G89.29 CHRONIC NECK PAIN: ICD-10-CM

## 2024-05-17 RX ORDER — GABAPENTIN 100 MG/1
100 CAPSULE ORAL 3 TIMES DAILY
Qty: 270 CAPSULE | Refills: 0 | Status: CANCELLED | OUTPATIENT
Start: 2024-05-17

## 2024-05-17 NOTE — TELEPHONE ENCOUNTER
Removed Gabapentin from request. Sent patient a MY Chart message that a refill was sent to their pharmacy on 04/15/24 for 270 with 1 refill

## 2024-07-30 ENCOUNTER — ATHLETIC TRAINING (OUTPATIENT)
Dept: SPORTS MEDICINE | Facility: OTHER | Age: 19
End: 2024-07-30

## 2024-07-30 DIAGNOSIS — Z02.5 ROUTINE SPORTS PHYSICAL EXAM: Primary | ICD-10-CM

## 2024-08-08 NOTE — PROGRESS NOTES
Patient took part in a [facility]'s Sports Physical event on 7/30/2024. Patient was cleared by provider to participate in sports.

## 2024-09-02 ENCOUNTER — ATHLETIC TRAINING (OUTPATIENT)
Dept: SPORTS MEDICINE | Facility: OTHER | Age: 19
End: 2024-09-02

## 2024-09-02 DIAGNOSIS — S43.002A SUBLUXATION OF LEFT SHOULDER JOINT, INITIAL ENCOUNTER: Primary | ICD-10-CM

## 2024-09-02 NOTE — PROGRESS NOTES
9/2/24: pt CC is bilateral shoulder pain, reports numbness and tingling shooting down arms, 3/10 pain today, issue been going on since last September when Left shoulder subluxed while performing an overhead press  Started tx with heating up for 12 minutes, pt feels adhesions and would like to work on them, then we will go into some trigger release, massage gun work and finish off with some stretching

## 2024-09-04 ENCOUNTER — ATHLETIC TRAINING (OUTPATIENT)
Dept: SPORTS MEDICINE | Facility: OTHER | Age: 19
End: 2024-09-04

## 2024-09-04 DIAGNOSIS — S43.002A SUBLUXATION OF LEFT SHOULDER JOINT, INITIAL ENCOUNTER: Primary | ICD-10-CM

## 2024-09-04 NOTE — PROGRESS NOTES
9/2/24: pt CC is bilateral shoulder pain, reports numbness and tingling shooting down arms, 3/10 pain today, issue been going on since last September when Left shoulder subluxed while performing an overhead press  Started tx with heating up for 12 minutes, pt feels adhesions and would like to work on them, then we will go into some trigger release, massage gun work and finish off with some stretching     9/4/24:  Incorporated strengthening exercises into her routine today. She explained her entire medical history. We finished with IASANGEL on back and lats.  CM

## 2024-09-11 ENCOUNTER — ATHLETIC TRAINING (OUTPATIENT)
Dept: SPORTS MEDICINE | Facility: OTHER | Age: 19
End: 2024-09-11

## 2024-09-11 DIAGNOSIS — S43.002A SUBLUXATION OF LEFT SHOULDER JOINT, INITIAL ENCOUNTER: Primary | ICD-10-CM

## 2024-09-15 NOTE — PROGRESS NOTES
9/2/24: pt CC is bilateral shoulder pain, reports numbness and tingling shooting down arms, 3/10 pain today, issue been going on since last September when Left shoulder subluxed while performing an overhead press  Started tx with heating up for 12 minutes, pt feels adhesions and would like to work on them, then we will go into some trigger release, massage gun work and finish off with some stretching      9/4/24:  Incorporated strengthening exercises into her routine today. She explained her entire medical history. We finished with IASTM on back and lats.  CM     9/11/24:  Completed moving cupping for tight mid trap and rhomboids.   Tolerated well.  RADHA

## 2024-09-24 ENCOUNTER — ATHLETIC TRAINING (OUTPATIENT)
Dept: SPORTS MEDICINE | Facility: OTHER | Age: 19
End: 2024-09-24

## 2024-09-24 DIAGNOSIS — S66.912A MUSCLE STRAIN OF LEFT WRIST, INITIAL ENCOUNTER: Primary | ICD-10-CM

## 2024-09-25 NOTE — PROGRESS NOTES
Athletic Training Wrist/Hand Evaluation    Name: Erick Baron  Age: 19 y.o.   School District: [employer]  Sport: Omar  Date of Assessment: 9/24/2024    Assessment/Plan:     Visit Diagnosis: Muscle strain of left wrist, initial encounter [I58.011H]    Treatment Plan:     []  Follow-up PRN.   []  Follow-up prior to next practice/game for re-evaluation.  []  Daily treatment/rehab. Progress note expected weekly.     Referral:     [x]  Not needed at this time  []  Referred to:     [x]  Coaching staff notified  []  Parent/Guardian Notified    Subjective:    Date of Injury: 9/23/24    Injury occurred during:     []  Practice  []  Competition  [x]  Other: Playing soccer    Mechanism: Falling    Previous History: None    Reported Symptoms:     [x] Hyperextension [] Numbness or tingling   [] Hyperflexion [] Weakness   [] Snapping sensation [] Grinding   [] Felt pop [] Sharp pain   [x] Pain with rest [] Burning   [] Pain with activity [x] Dull or achy   [] Loss of motion       Objective:    Observation:     [x]  No observable findings compared bilaterally    [] Swelling [] Jersey finger   [] Ecchymosis [] Mallet finger   [] Atrophy [] Abnormal contours   [] Callous or blister [] Nail abnormality   [] Deformity [] Subungual hematoma   [] Boutonniere deformity [] Ingrown nail   [] [place] neck deformity [] Laceration     Palpation: TTP on extensor tendons    Active Range of Motion:      Full  ROM Limited  ROM Pain  with  ROM No  Motion   Wrist Flexion [x] [] [x] []   Wrist Extension [x] [] [x] []   Pronation [] [] [] []   Supination [] [] [] []   Radial Deviation [] [] [] []   Ulnar Deviation [] [] [] []   Thumb Flexion [] [] [] []   Thumb Extension [] [] [] []   Thumb Abduction [] [] [] []   Thumb Adduction [] [] [] []   MP Flexion [] [] [] []   MP Extension [] [] [] []   PIP Flexion [] [] [] []   PIP Extension [] [] [] []   DIP Flexion [] [] [] []   DIP Extension [] [] [] []     Manual Muscle Tests:     Not  performed [x]             5 4+ 4 4- 3 or  Under   Wrist Flexion [] [] [] [] []   Wrist Extension [] [] [] [] []   Pronation [] [] [] [] []   Supination [] [] [] [] []   Radial Deviation [] [] [] [] []   Ulnar Deviation [] [] [] [] []   Thumb Flexion [] [] [] [] []   Thumb Extension [] [] [] [] []   Thumb Abduction [] [] [] [] []   Thumb Adduction [] [] [] [] []   MP Flexion [] [] [] [] []   MP Extension [] [] [] [] []   PIP Flexion [] [] [] [] []   PIP Extension [] [] [] [] []   DIP Flexion [] [] [] [] []   DIP Extension [] [] [] [] []     Special Tests:      (+)  Laxity (+)  Pain (-)  WNL Not  Tested   Compression [] [] [x] []   Distraction [] [] [x] []   Percussion [] [] [] []   Tuning Fork [] [] [x] []   Valgus Stress [] [] [x] []   Varus Stress [] [] [x] []   Wrist [place] [] [] [x] []   Tinel's [] [] [] []   Phalen's [] [] [] []   Reverse Phalen's [] [] [] []   Finkelstein's [] [] [] []   Tashi Scaphoid Shift [] [] [] []   Triangular Fibrocartilage [] [] [] []   Lunotriquetrial Shear [] [] [] []     Treatment Log:    Date: 9/24   Playing Status: As tolerated       Exercise/Treatment    Rice Bucket X   Weighted extension 4x4                                         Pt was told to check in at the end of the week.  CM

## 2024-10-31 ENCOUNTER — ATHLETIC TRAINING (OUTPATIENT)
Dept: SPORTS MEDICINE | Facility: OTHER | Age: 19
End: 2024-10-31

## 2024-10-31 DIAGNOSIS — M72.2 PLANTAR FASCIA SYNDROME: Primary | ICD-10-CM

## 2024-10-31 NOTE — PROGRESS NOTES
10/31/24: pt came in for some tx on plantar fascia and shoulders     Started with heat for about 10 minutes   Then did scrapping on plantar fascia then achillies   Then we heated back/shoulder and did some trigger release with cupping on the tougher knots, pt did feel a lot of release after tx

## 2025-01-31 ENCOUNTER — ATHLETIC TRAINING (OUTPATIENT)
Dept: SPORTS MEDICINE | Facility: OTHER | Age: 20
End: 2025-01-31

## 2025-01-31 DIAGNOSIS — M25.512 CHRONIC LEFT SHOULDER PAIN: Primary | ICD-10-CM

## 2025-01-31 DIAGNOSIS — G89.29 CHRONIC LEFT SHOULDER PAIN: Primary | ICD-10-CM

## 2025-02-04 ENCOUNTER — ATHLETIC TRAINING (OUTPATIENT)
Dept: SPORTS MEDICINE | Facility: OTHER | Age: 20
End: 2025-02-04

## 2025-02-04 DIAGNOSIS — S76.311A STRAIN OF RIGHT HAMSTRING, INITIAL ENCOUNTER: Primary | ICD-10-CM

## 2025-02-06 NOTE — PROGRESS NOTES
Athletic Training Hip/Thigh Evaluation     Name: Erick Baron  Age: 19 y.o.   School District: [employer]  Sport: T/F  Date of Assessment: 2/4/2025     Assessment/Plan:      Visit Diagnosis: Strain of right hamstring, initial encounter [S76.198T]     Treatment Plan:     []  Follow-up PRN.   [x]  Follow-up prior to next practice/game for re-evaluation.  []  Daily treatment/rehab. Progress note expected weekly.      Referral:      []  Not needed at this time  []  Referred to:      [x]  Coaching staff notified  []  Parent/Guardian Notified      Subjective:     Date of Injury: 2/4/25     Injury occurred during:      [x]  Practice  []  Competition  []  Other:      Mechanism: Triple jump    Previous History: 4 previous hamstring strains of right hamstring     Reported Symptoms:      [] Pain with rest [] Pressure   [x] Pain with activity [] Burning   [] Pain with stairs [] Weakness   [x] Sharp pain [] Loss of motion   [] Dull pain [] Clicking   [x] Felt pop [] Snapping sensation   [] Felt give way [] Radiating pain   [] Grinding          Objective:    Observation:      [x]  No observable findings compared bilaterally     [] Swelling [] Genu recurvatum   [] Deformity [] Genu valgum   [] Ecchymosis [] Genu varus   [] Abnormal gait [] Hip anteversion   [] Atrophy [] Hip retroversion   [] Muscle spasm [] Patella abnormality   [] Spine curvature          Palpation: TTP in medial hamstring belly     Active Range of Motion:        Full  ROM Limited  ROM Pain  with  ROM No  Motion   Hip Flexion  [] [] [x] []   Hip Extension [] [] [] []   Hip Abduction [] [] [] []   Hip Adduction [] [] [] []   Hip Internal Rotation [] [] [] []   Hip External Rotation [] [] [] []   Knee Flexion [] [] [x] []   Knee Extension [] [] [] []      Manual Muscle Tests:      Not performed []                     5 4+ 4 4- 3 or  Under   Hip Flexion  [] [] [] [] []   Hip Extension [] [] [] [] []   Hip Abduction [] [] [] [] []   Hip Adduction [] []  [] [] []   Hip Internal Rotation [] [] [] [] []   Hip External Rotation [] [] [] [] []   Knee Flexion [] [x] [] [] []   Knee Extension [] [] [] [] []      Special Tests:        (+)  Tightness (+)  Pain (-)  WNL Not  Tested   Fulcrum [] [] [] []   Ely’s [] [] [] []   Shine [] [] [] []   Tyrell (Modified Shine) [] [] [] []   Brent []  [] [] []   Piriformis [] [] [] []   TRESSA [] [] [] []   FADIR [] [] [] []   SI Compression/Distraction [] [] [] []     (+)  Clicking (+)  Pain (-)  WNL Not  Tested   Hip Scour [] [] [] []     (+)  POS   (-)  WNL Not  Tested   Long Sit Test [] Leg  Discrepancy [] []   Trendelenberg's  [] Pelvic  Drop [] []       Treatment Log:    Date:     Playing Status:           Exercise/Treatment

## 2025-02-07 ENCOUNTER — ATHLETIC TRAINING (OUTPATIENT)
Dept: SPORTS MEDICINE | Facility: OTHER | Age: 20
End: 2025-02-07

## 2025-02-07 DIAGNOSIS — M25.512 CHRONIC LEFT SHOULDER PAIN: Primary | ICD-10-CM

## 2025-02-07 DIAGNOSIS — G89.29 CHRONIC LEFT SHOULDER PAIN: Primary | ICD-10-CM

## 2025-02-07 NOTE — PROGRESS NOTES
2/7/25: pt came in asking for passive stretching on shoulders, was completed then did some joint mobs to 'warm up' her body before competition today and did some occipital release

## 2025-02-10 ENCOUNTER — ATHLETIC TRAINING (OUTPATIENT)
Dept: SPORTS MEDICINE | Facility: OTHER | Age: 20
End: 2025-02-10

## 2025-02-10 DIAGNOSIS — M53.3 CHRONIC SI JOINT PAIN: Primary | ICD-10-CM

## 2025-02-10 DIAGNOSIS — G89.29 CHRONIC SI JOINT PAIN: Primary | ICD-10-CM

## 2025-02-10 NOTE — PROGRESS NOTES
2/10    Athlete reported that she was getting pain during the track meet on Saturday. Athlete stated that her pain was getting so bad that she lost a good 20 cm on her jumps. Athlete stated that her pain was mostly on her back and in the SI joint but also had pain shooting up her back and shooting down her leg. Athlete stated that her pain just keeps getting worse and worse. Athlete stated that she has been doing her stretches and just hasn't gotten any better. Athlete completed some SI joint mobility stretches.  AF

## 2025-02-14 ENCOUNTER — ATHLETIC TRAINING (OUTPATIENT)
Dept: SPORTS MEDICINE | Facility: OTHER | Age: 20
End: 2025-02-14

## 2025-02-14 DIAGNOSIS — G89.29 CHRONIC SI JOINT PAIN: Primary | ICD-10-CM

## 2025-02-14 DIAGNOSIS — M53.3 CHRONIC SI JOINT PAIN: Primary | ICD-10-CM

## 2025-02-14 NOTE — PROGRESS NOTES
2/14    Athlete stated that her back was still sore and hurting today. Athlete stated that her left side is still where she is getting the most amount of pain in her back. Athlete reported that her back pain hasn't gotten any better. Noticed on the athlete that her PSIS on her left side doesn't move like her right side does. Athlete also has weak glutes after doing trendelenburg's sign. Athlete also has a very tight piriformis and back muscles.     Athlete completed the following:  Myofacial release on piriformis  Pelvis movement  IASTM on her feet and low back

## 2025-02-19 ENCOUNTER — ATHLETIC TRAINING (OUTPATIENT)
Dept: SPORTS MEDICINE | Facility: OTHER | Age: 20
End: 2025-02-19

## 2025-02-19 DIAGNOSIS — G89.29 CHRONIC LEFT SHOULDER PAIN: Primary | ICD-10-CM

## 2025-02-19 DIAGNOSIS — M25.512 CHRONIC LEFT SHOULDER PAIN: Primary | ICD-10-CM

## 2025-02-19 NOTE — PROGRESS NOTES
2/19/25: pt came in for some tx on right shoulder, started with some heat then did some AAROM with stick then ended with some trigger release and some neck mobs

## 2025-03-24 ENCOUNTER — ATHLETIC TRAINING (OUTPATIENT)
Dept: SPORTS MEDICINE | Facility: OTHER | Age: 20
End: 2025-03-24

## 2025-03-24 DIAGNOSIS — S06.0X0A CONCUSSION WITHOUT LOSS OF CONSCIOUSNESS, INITIAL ENCOUNTER: Primary | ICD-10-CM

## 2025-03-25 ENCOUNTER — ATHLETIC TRAINING (OUTPATIENT)
Dept: SPORTS MEDICINE | Facility: OTHER | Age: 20
End: 2025-03-25

## 2025-03-25 DIAGNOSIS — M62.89 TIGHTNESS OF FASCIA: Primary | ICD-10-CM

## 2025-03-25 NOTE — PROGRESS NOTES
"Athletic Training Head Injury Evaluation     Name: Erick Baron  Age: 19 y.o.   School District: [employer]  Sport: Track     Assessment/Plan:     Visit Diagnosis: Concussion without loss of consciousness, initial encounter [S06.0X0A]     Treatment Plan: Athlete will be seen for a concussion. Athlete completed an ImPACT test.    []  Follow-up PRN.   []  Follow-up prior to next practice/game for re-evaluation.  []  Daily treatment/rehab. Progress note expected weekly.   [x]  Athlete will follow up every day to complete a symptom checklist.     Referral:      []  Not needed at this time  []  Will re-evaluate tomorrow to determine if referral is needed  [x]  Referred to: Dr. 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 She was playing goalie when a shot hit the upper cross bar and came down and hit her in the top of her head. Athlete did not loss consciousness.     History: No history of previous concussions       Has the athlete ever been: Yes No   Hospitalized for a head injury? [] [x]   Diagnosed/treated for headache disorder or migraines? [] [x]   Diagnosed with a learning disability/dyslexia? [] [x]   Diagnosed with ADD/ADHD [] [x]   Diagnosed with depression, anxiety, or other psychiatric disorder? [] [x]      Current medications? If yes, please list:   [x]  None  []  Yes:          Symptom Evaluation     0 = No Symptoms; 1 or 2 = Mild Symptoms; 3 or 4 = Moderate Symptoms, 5 or 6 = Severe Symptoms       (Insert Columns as needed for subsequent dates)  Date: 3/24/2025   Symptom Symptom Score   Headache 4    Pressure in head 4    Neck Pain 4    Nausea or vomiting 4    Dizziness 2    Blurred Vision 2    Balance Problems 1    Sensitivity to light 2    Sensitivity to noise 3    Feeling slowed down 4    Feeling like \"in a fog\" 3    Don't feel " right 2    Difficulty concentrating 1    Difficulty remembering 0    Fatigue or low energy 4    Confusion 0    Drowsiness 2    More emotional 1    Irritability 0    Sadness 3    Nervous or Anxious 3    Trouble falling asleep (if applicable) 2    Total number of Symptoms (of 22):  19   Symptom Severity Score (of 132): 51    Do your symptoms get worse with physical activity?  Yes   Do your symptoms get worse with mental activity?  Yes         If 100% is feeling perfectly normal, what percent of normal do you feel? 75%     If not 100%, why? Feeling nausea, headache, pressure in head, dizziness, neck pain with increase in movement, low energy and doesn't feel like myself

## 2025-03-27 ENCOUNTER — OFFICE VISIT (OUTPATIENT)
Age: 20
End: 2025-03-27
Payer: COMMERCIAL

## 2025-03-27 VITALS — HEIGHT: 65 IN | WEIGHT: 135 LBS | BODY MASS INDEX: 22.49 KG/M2
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
## 2025-03-27 DIAGNOSIS — S06.0X0A CONCUSSION WITHOUT LOSS OF CONSCIOUSNESS, INITIAL ENCOUNTER: Primary | ICD-10-CM

## 2025-03-27 PROBLEM — F84.0 AUTISTIC SPECTRUM DISORDER: Status: ACTIVE | Noted: 2023-11-28

## 2025-03-27 PROBLEM — F39 MOOD DISORDER (HCC): Status: ACTIVE | Noted: 2023-10-03

## 2025-03-27 PROCEDURE — 99214 OFFICE O/P EST MOD 30 MIN: CPT | Performed by: STUDENT IN AN ORGANIZED HEALTH CARE EDUCATION/TRAINING PROGRAM

## 2025-03-27 PROCEDURE — 96132 NRPSYC TST EVAL PHYS/QHP 1ST: CPT | Performed by: STUDENT IN AN ORGANIZED HEALTH CARE EDUCATION/TRAINING PROGRAM

## 2025-03-27 NOTE — PROGRESS NOTES
Chief Complaint: head injury, concussion evaluation    HPI:       Patient ID:  Erick Baron is a 19 y.o. female    Chief Complaint   Patient presents with    Head - Follow-up       School/Work: [employer]  Sport: Soccer  Date of Injury: 3/23/2025  Follow up interval: 4 days    School Status: Back in school full-time    Injury Description:  Date / Time: 3/23/2025  :  Patient  Injury Description: While participating as a soccer goalie, she states an opponent kicked the ball which struck the crossbar above her and rebounded and impacted the top/crown of her head  Evidence of forcible blow to the head:  no  Evidence of IC Injury / Fracture:  no  Location:   Top/crown    Amnesia:   Retrograde:  no   Anterograde:  no   LOC:  no  Early Signs:  Dizziness, Headache, Nausea, and increased light and noise sensitivity more than baseline as well as numbness and tingling of her left upper extremity more than baseline  Seizures:  No  CT Scan:  No   History of Headaches at baseline: Patient does have an unspecified headache/migraine disorder at baseline.  She feels that her current headaches are different though as they are much more global throughout the head and described as pressure-like.    Of note patient also has a history of chronic neck pain and numbness and tingling of her left upper extremity in unspecified distribution.  I had seen patient for this issue in the past and an EMG was done on 6/26/2024 for her left upper extremity that was unremarkable.  History of Concussion: Denies   Headache History:  Yes:   Location:   Globally throughout head, Radiation:   None, Pain - quality:   Pressure, Onset mode:   Constant, Timing:   Constant, Current symptom frequency:   Daily, Current symptom duration:   N/A, Severity:   Ranges from mild to severe depending on aggravating factors, Progression:   Improving since wearing sunglasses over the past couple days, Exacerbating factors:   Physical activity, Cognitive  activity, and light, noise, Relieving factors:   Use of sunglasses, avoiding loud situations, Associated Symptoms:   As per ROS below.  Of note patient has history of chronic neck pain and numbness of left upper extremity that she feels has been slightly more exacerbated since head injury, Current treatment:   Restricted activity, and Symptom control:   Fair  Family History of Headache:  No  Developmental History:  Denies h/o ADHD/ADD  History of Sleep Disorder:  No  Psychiatric History:  + h/o mood related disorder/depression  Do symptoms worsen with Physical Activity?  Yes  Do symptoms worsen with Cognitive Activity?  Yes  Overall Rating:  What percent is this person back to normal?  Patient 70 %      The following portions of the patient's history were reviewed and updated as appropriate: allergies, current medications, past family history, past medical history, past social history, past surgical history, and problem list.             Symptoms Checklist      Flowsheet Row Most Recent Value   Physical    Headache 1   Nausea 1   Vomiting 0   Balance problems 0   Dizziness 1   Visual problems 1   Fatigue 1   Sensitivity to light 1   Sensitivity to noise 1   Numbness / tingling 1   TOTAL PHYSICAL SCORE 8   Cognitive    Foggy 1   Slowed down 1   Difficulty concentrating 1   Difficulty remembering 0   TOTAL COGNITIVE SCORE 3   Emotional    Irritability 1   Sadness 1   More emotional 1   Nervousness 1   TOTAL EMOTIONAL SCORE 4   Sleep    Drowsiness 1   Sleeping less 0   Sleeping more 0   Difficulty falling asleep 1   TOTAL SLEEP SCORE 2   TOTAL SYMPTOM SCORE 17              I have personally reviewed pertinent films in PACS.    No recent relevant imaging        Patient Active Problem List   Diagnosis    Autistic spectrum disorder    Mood disorder (HCC)        Current Outpatient Medications on File Prior to Visit   Medication Sig Dispense Refill    gabapentin (NEURONTIN) 100 mg capsule Take 1 capsule (100 mg total) by  mouth 3 (three) times a day 270 capsule 1    lidocaine (XYLOCAINE) 5 % ointment Apply topically as needed for mild pain 50 g 0    naproxen (Naprosyn) 500 mg tablet Take 1 tablet (500 mg total) by mouth 2 (two) times a day as needed for mild pain 40 tablet 1     No current facility-administered medications on file prior to visit.        Allergies   Allergen Reactions    [place] Oil - Food Allergy Anaphylaxis    Peach Flavor [Flavoring Agent] Anaphylaxis    Tart Tarango (Prunus Cerasus) Anaphylaxis    [place] Anaphylaxis        Tobacco Use: Low Risk  (3/27/2025)    Patient History     Smoking Tobacco Use: Never     Smokeless Tobacco Use: Never     Passive Exposure: Never        Social Drivers of Health     Tobacco Use: Low Risk  (3/27/2025)    Patient History     Smoking Tobacco Use: Never     Smokeless Tobacco Use: Never     Passive Exposure: Never   Alcohol Use: Not At Risk (8/16/2019)    [facility]    AUDIT-C     Frequency of Alcohol Consumption: Never     Average Number of Drinks: Not on file     Frequency of Binge Drinking: Not on file   Financial Resource Strain: Not on file   Food Insecurity: Not on file   Transportation Needs: Not on file   Physical Activity: Not on file   Stress: Not on file   Social Connections: Not on file   Intimate Partner Violence: Unknown (9/19/2023)    [facility]    Intimate Partner Violence     Within the last year, have you been afraid of your partner, ex-partner or family member?: Not on file     Within the last year, have you been humiliated or emotionally abused in other ways by your partner, ex-partner or family member?: Not on file     Within the last year, have you been kicked, hit, slapped, or otherwise physically hurt by your partner, ex-partner or family member?: Not on file     Within the last year, have you been raped or forced to have any kind of sexual activity by your partner,  "ex-partner or family member?: Not on file   Depression: At risk (10/2/2023)    PHQ-2     PHQ-2 Score: 6   Housing Stability: Not on file   Utilities: Not on file   Health Literacy: Not on file        Review of Systems     Body mass index is 22.47 kg/m².     Physical Exam     Physical Exam       Ht 5' 5\" (1.651 m)   Wt 61.2 kg (135 lb)   BMI 22.47 kg/m²   General:   NAD:  Yes  Psych:   AAOX3:  Yes   Mood and Affect:  Normal  HEENT:   Lacerations:  No   Bruising:  No   PEERLA:  Yes     Neuro:   Examination of Coordination:  Abnormal:   Limited Balance:   No, Romberg:   Normal, Past Pointing:   Normal, Single Leg Stance:   Abnormal.  Explain:  0 errors eyes open, 1 error eyes closed, Forward Tandem Gait:   Normal, Backward Tandem Gait:   Normal, Eyes Close Tandem Gait:   Abnormal.  Explain:  1 error eyes closed, Dysdiadochokinesia:   Bilateral:   No, and Finger - Nose Impaired:   Bilateral:   No   CNII - XII Intact:  Yes   FTN:  Normal   Accommodation:  5cm b/l   Convergence:  5cm    Vestibular Ocular:  Gaze stability:  Abnormal:   Patient reports sense of dizziness and increased headaches when doing horizontal and vertical gazing       Cervical  ROM: intact in all planes of motion  Flexion: chin within 3-4cm of chest  Extension: 70  Lateral flexion: 30 bilaterally  Rotation: 70 bilaterally    Midline spinous process tenderness: None, does have pain along the occipital aspect of the head  Muscular Tenderness: None          ImPACT Neurocognitive Test Interpretation:  Date of testing: 3/26/2025  Place of testing: Closed office [facility]  Baseline test available and valid?  Yes    Composite Score Percentile Value Comparable to baseline   Memory Composite  (Verbal) 14 Yes   Memory Composite (visual) 53 Yes   Visual Motor Speed Composite: 54 Yes   Reaction Time Composite 67 Yes   Impulse control composite 21 No     Total Symptom Score: 49    Significant symptom worsening post-test ? Yes    Clinically correlated " "ImPACT neurocognitive scores appear comparable to baseline/ normative data? No    I spent a minimum of 31 minutes coordinating patient's  to conduct IMPACT test along with time analyzing, interpreting, and explaining the results of patient's IMPACT test on today's visit.     Assessment:     Diagnosis ICD-10-CM Associated Orders   1. Concussion without loss of consciousness, initial encounter  S06.0X0A           Other factors:  History of depression/mood related disorder  History of migraines  History of chronic neck pain and reported numbness and tingling of left upper extremity.  Had improved with use of gabapentin but patient eventually tapered himself off this medication  EMG done on 6/26/2024 was unremarkable    Plan:     I explained my current clinical findings to Erick Baron . We had a detailed discussion with regards to pathophysiology of a concussion injury along with its immediate, short-term and long-term complications.      1. Physical activity -restricted to light aerobic activities as tolerated for now.     2. Cognitive / academic activity -accommodated as per [facility] [employer] form academic accommodations     3. Symptomatic treatment -acetaminophen/NSAIDs for headaches     4. Other management -as per patient instructions.  Counseled use of sunglasses/bluelight glasses for light sensitivity, earplugs for noise sensitivity and avoiding loud situations.     5. Referrals made -none      Follow-Up:    In approximately 2 weeks at [facility]        Portions of the record may have been created with voice recognition software. Occasional wrong word or \"sound alike\" substitutions may have occurred due to the inherent limitations of voice recognition software. Please review the chart carefully and recognize, using context, where substitutions/typographical errors may have occurred.          "

## 2025-04-07 ENCOUNTER — OFFICE VISIT (OUTPATIENT)
Age: 20
End: 2025-04-07
Payer: COMMERCIAL

## 2025-04-07 VITALS — BODY MASS INDEX: 22.49 KG/M2 | WEIGHT: 135 LBS | HEIGHT: 65 IN

## 2025-04-07 DIAGNOSIS — S06.0X0D CONCUSSION WITHOUT LOSS OF CONSCIOUSNESS, SUBSEQUENT ENCOUNTER: Primary | ICD-10-CM

## 2025-04-07 PROCEDURE — 99213 OFFICE O/P EST LOW 20 MIN: CPT | Performed by: STUDENT IN AN ORGANIZED HEALTH CARE EDUCATION/TRAINING PROGRAM

## 2025-04-07 NOTE — LETTER
April 7, 2025     Patient: Erick Baron  YOB: 2005  Date of Visit: 4/7/2025      To Whom it May Concern:    Erick Baron is under my professional care. Erick was seen in my office on 4/7/2025. Please excuse her from classes this afternoon. [facility]. Planned follow up in 1 week.    If you have any questions or concerns, please don't hesitate to call.         Sincerely,          Darrell Walker MD        CC: No Recipients

## 2025-04-07 NOTE — PROGRESS NOTES
Assessment / Plan     Begin RTP:  No    Diagnoses and all orders for this visit:    Concussion without loss of consciousness, subsequent encounter    Other orders  -     diphenhydrAMINE (BENADRYL) 50 MG tablet; Take 50 mg by mouth daily at bedtime as needed for itching        Other factors:  History of depression/mood related disorder  History of migraines  History of chronic neck pain and reported numbness and tingling of left upper extremity.  Had improved with use of gabapentin but patient eventually tapered himself off this medication  EMG done on 6/26/2024 was unremarkable      I explained my current clinical findings to Erick Baron   . We had a detailed discussion with regards to pathophysiology of a concussion injury along with its immediate, short-term and long-term complications.      1. Physical activity -continue light aerobics activity as tolerated, walking, jogging, stationary biking.     2. Cognitive / academic activity -continue academic accommodations as per [facility] [employer] accommodations form     3. Symptomatic treatment -as needed use of acetaminophen, NSAIDs.  Patient had been prescribed gabapentin 100 mg 3 times daily in regards to her history of prior headaches/numbness and tingling of upper extremities.  I encouraged her to restart this medication for now     4. Other management -start vestibular therapy in regards to difficulties with vision/dizziness with athletic training team going forward     5. Referrals made -none    Up in 1 to 2 weeks to reevaluate progress    Subjective       Patient ID:  Erick Baron is a 19 y.o. here today for follow up concussion evaluation    School/Work: [employer]  Sport: Soccer  Date of Injury: 3/23/2025  Follow up interval: 2 weeks, 1 day    Change in Symptoms:  Better  Explain: Patient reports headache intensity has been improving.  She states she still has the baseline amount of neck and occipital headaches and that she still will have  intermittent right frontal sharp headaches as well.  Headaches can be aggravated with concentrating, light/noise but feels she no longer needs to wear sunglasses often.  She has not been taking any pain medications for headaches and had not been taking the gabapentin 100 mg 3 times daily since we last talked.  She notes other ROS as per below.  Patient is frustrated/tearful as well she does feel some improvement she still feels that her headaches are different than her baseline chronic headaches and numbness and tingling of her upper extremities.  She feels that her balance is fine but that she still experiences dizziness and issues with her vision.   Back to School Status:   Attending school full-time under school accommodations; patient states that some of her accommodations were not honored for her film class and has been feeling as a result.  Current Physical Activity:   Light aerobics; she does note being in a ballet class but with accommodations and only notices issues when her neck is in a flexed position (causes dizziness/occipital headaches and was present prior to concussion)  Subsequent Injuries:  No  Do symptoms worsen with Physical Activity?  Yes  Do symptoms worsen with Cognitive Activity?  Yes  Overall Rating:  What percent is this person back to normal?  Patient 80 %  Response to Meds:  N/A    The following portions of the patient's history were reviewed and updated as appropriate: allergies, current medications, past family history, past medical history, past social history, past surgical history, and problem list.    Symptoms Checklist      Flowsheet Row Most Recent Value   Physical    Headache 1   Nausea 1   Vomiting 0   Balance problems 1   Dizziness 1   Visual problems 1   Fatigue 1   Sensitivity to light 0   Sensitivity to noise 1   Numbness / tingling 1   TOTAL PHYSICAL SCORE 8   Cognitive    Foggy 0   Slowed down 1   Difficulty concentrating 1   Difficulty remembering 0   TOTAL COGNITIVE  SCORE 2   Emotional    Irritability 1   Sadness 1   More emotional 1   Nervousness 1   TOTAL EMOTIONAL SCORE 4   Sleep    Drowsiness 0   Sleeping less 1   Sleeping more 0   Difficulty falling asleep 1   TOTAL SLEEP SCORE 2   TOTAL SYMPTOM SCORE 16                 Physical Exam     There were no vitals filed for this visit.      General:   NAD:  Yes  Psych:   AAOX3:  Yes   Mood and Affect:  Normal  HEENT:   Lacerations:  No   Bruising:  No   PEERLA:  Yes   EOMI:  Yes   C/D/I:  Yes   Fracture/Trauma:  No   Fundi Discs Sharp:  N/A  Neuro:   Examination of Coordination:  Abnormal:   Limited Balance:   No, Past Pointing:   Normal, Single Leg Stance:   Abnormal.  Explain:  0 errors eyes open, 1 error eyes closed, Forward Tandem Gait:   Normal, Backward Tandem Gait:   Normal, Eyes Close Tandem Gait:   Normal, Dysdiadochokinesia:   Bilateral:   No, and Finger - Nose Impaired:   Bilateral:   No   CNII - XII Intact:  Yes   FTN:  Normal   Accommodation:  5cm b/l   Convergence:  5cm  Vestibular Ocular:  Gaze stability:  Abnormal:   Dizziness with verticle motion

## 2025-04-08 ENCOUNTER — ATHLETIC TRAINING (OUTPATIENT)
Dept: SPORTS MEDICINE | Facility: OTHER | Age: 20
End: 2025-04-08

## 2025-04-08 DIAGNOSIS — M62.89 TIGHTNESS OF FASCIA: Primary | ICD-10-CM

## 2025-04-10 ENCOUNTER — ATHLETIC TRAINING (OUTPATIENT)
Dept: SPORTS MEDICINE | Facility: OTHER | Age: 20
End: 2025-04-10

## 2025-04-10 DIAGNOSIS — S06.0X0D CONCUSSION WITHOUT LOSS OF CONSCIOUSNESS, SUBSEQUENT ENCOUNTER: ICD-10-CM

## 2025-04-10 DIAGNOSIS — M62.89 TIGHTNESS OF FASCIA: Primary | ICD-10-CM

## 2025-04-10 NOTE — PROGRESS NOTES
4/8/25  S- pt has chronic muscle knotting and neuromuscular impingement issues, active concussion ongoing. Reports dizziness, nausea, and cranial pressure with cervical flexion, worsened by ongoing concussion symptoms. States she is compensating for lack of flexion with increased cervical and lumbar flexion, causing increased tightness and pain.    O- Significant palpable muscle knotting throughout the back and shoulder muscles.   (+) vertebral artery test  A- ongoing concussion, brachial plexus compression/TOS, muscular tightness  P- reduce pain, concussion symptoms; begin vestibular therapy and RTP as concussion symptoms subside.    - Self-releases for QL and obliques   - Trigger point therapy with IASTM on rhomboids, erector spinae, lats, teres major, levator scap, upper trap  - IFC STIM with thermotherapy for R thoracic multifidus  - Reciprocal inhibition for rhomboids, levator scapula, and QL  - Cervical joint mobs  - Occipital release  - TMJ release    ES, ATS    4/9/25  Pt reports mild improvement from soft tissue work Monday, still significant knotting and neural symptoms. Began vestibular therapy for concussion yesterday after treatment.  - Cervical isometrics  - Meditation, progressive relaxation, and imagery  - Stationary bike- completed 2 min before symptoms increased (nausea, headache)  - Thermotherapy- thoracolumbar, cervical  - cat-cows, thread the needle- 3x10 each  - Vestibular therapy- thumb tracking- 3 reps completed before symptoms worsened  - Cupping- lat/teres major with movement   - Prone lat pulldowns   - Cat-cows   - Child's pose   - Thread the needle  - Occipital release, cervical spine mobs  - Trigger point therapy with IASTM- cervical erector spinae, levator scap, SCM, scalenes  - Pt reported specific nerve symptoms with scalene trigger points (dorsal scapular, long thoracic, subscapular, radial, ulnar)  - Pt reported significant improvement in flexibility, cranial pressure immediately  post-treatment  ES, ATS

## 2025-04-10 NOTE — PROGRESS NOTES
4/8/25: pt came in for appointment, started with heat, lots of trigger point was completed, the AAROM and PROM was completed as well

## 2025-04-11 ENCOUNTER — ATHLETIC TRAINING (OUTPATIENT)
Dept: SPORTS MEDICINE | Facility: OTHER | Age: 20
End: 2025-04-11

## 2025-04-11 DIAGNOSIS — M62.89 TIGHTNESS OF FASCIA: Primary | ICD-10-CM

## 2025-04-11 NOTE — PROGRESS NOTES
4/11/25  Ath completed progressive relaxation during ballet this morning and feels this technique is helping mitigate some of her cranial pressure.   Symptoms: 18/22, 31/132, 86%  Completed:   Progressive relaxation   Bike - 1.06   AAROM with stick- Flexion and abduction   Cat cows  Child pose   Trigger point on neck musculature       4/8/25  S- pt has chronic muscle knotting and neuromuscular impingement issues, active concussion ongoing. Reports dizziness, nausea, and cranial pressure with cervical flexion, worsened by ongoing concussion symptoms. States she is compensating for lack of flexion with increased cervical and lumbar flexion, causing increased tightness and pain.    O- Significant palpable muscle knotting throughout the back and shoulder muscles.   (+) vertebral artery test  A- ongoing concussion, brachial plexus compression/TOS, muscular tightness  P- reduce pain, concussion symptoms; begin vestibular therapy and RTP as concussion symptoms subside.    - Self-releases for QL and obliques   - Trigger point therapy with IASTM on rhomboids, erector spinae, lats, teres major, levator scap, upper trap  - IFC STIM with thermotherapy for R thoracic multifidus  - Reciprocal inhibition for rhomboids, levator scapula, and QL  - Cervical joint mobs  - Occipital release  - TMJ release    ES, ATS    4/9/25  Pt reports mild improvement from soft tissue work Monday, still significant knotting and neural symptoms. Began vestibular therapy for concussion yesterday after treatment.  - Cervical isometrics  - Meditation, progressive relaxation, and imagery  - Stationary bike- completed 2 min before symptoms increased (nausea, headache)  - Thermotherapy- thoracolumbar, cervical  - cat-cows, thread the needle- 3x10 each  - Vestibular therapy- thumb tracking- 3 reps completed before symptoms worsened  - Cupping- lat/teres major with movement   - Prone lat pulldowns   - Cat-cows   - Child's pose   - Thread the needle  -  Occipital release, cervical spine mobs  - Trigger point therapy with IASTM- cervical erector spinae, levator scap, SCM, scalenes  - Pt reported specific nerve symptoms with scalene trigger points (dorsal scapular, long thoracic, subscapular, radial, ulnar)  - Pt reported significant improvement in flexibility, cranial pressure immediately post-treatment  ES, ATS

## 2025-04-14 ENCOUNTER — OFFICE VISIT (OUTPATIENT)
Age: 20
End: 2025-04-14
Payer: COMMERCIAL

## 2025-04-14 VITALS — WEIGHT: 135 LBS | BODY MASS INDEX: 22.49 KG/M2 | HEIGHT: 65 IN

## 2025-04-14 DIAGNOSIS — S06.0X0D CONCUSSION WITHOUT LOSS OF CONSCIOUSNESS, SUBSEQUENT ENCOUNTER: Primary | ICD-10-CM

## 2025-04-14 PROCEDURE — 99213 OFFICE O/P EST LOW 20 MIN: CPT | Performed by: STUDENT IN AN ORGANIZED HEALTH CARE EDUCATION/TRAINING PROGRAM

## 2025-04-14 NOTE — PROGRESS NOTES
Assessment / Plan       Diagnoses and all orders for this visit:    Concussion without loss of consciousness, subsequent encounter      Other factors:  History of depression/mood related disorder - patient tearful on visits.  Patient is not on any mood stabilizing medications.  I had prescribed her gabapentin in the past in regards to the numbness sensation of her upper extremities.  History of headache/migraine d/o at baseline  History of chronic neck pain and reported numbness and tingling of left upper extremity. Had EMG done and MRI c-spine done that had been unremarkable.  Reduction in numbness sensations with gabapentin  Autism spectrum disorder      I explained my current clinical findings to Erick Baron   . We had a detailed discussion with regards to pathophysiology of a concussion injury along with its immediate, short-term and long-term complications.      1. Physical activity -I counseled this point given patient has been keeping up academically with classes and that her headache symptoms seem to be more consistent with headache she experiences at baseline that she could progressively start a return to play protocol going forward under discretion of her athletic trainers.  This will be challenging given patient has a baseline amount of headache syndromes, upper extremity numbness and tingling at baseline.  I counseled her that she does demonstrate enough today that I feel she can start return to play protocol.  Counseled she we need to lift the academic accommodations her to complete return to play protocol     2. Cognitive / academic activity -continue academic accommodations as per [facility] school accommodations form until patient feels these can be lifted in order to complete return to play protocol.     3. Symptomatic treatment -as needed use of acetaminophen, NSAIDs.  Patient has restarted gabapentin 100 mg 3 times daily which is seems to have reduced her symptoms of headaches and episodes of  numbness and tingling of her upper extremities.       4. Other management -continue vestibular therapy in regards to difficulties with vision/dizziness with athletic training team going forward.  We did have a discussion of potentially obtaining an MRI of her brain without contrast if symptoms seems to be persisting beyond 4 weeks postinjury or if she is unable to complete a return to play protocol.  Patient becomes very concerned and upset about this as she states that she no longer has her own insurance.  She does not feel this coinsurance will cover expenses if an MRI of her brain was obtained.     5. Referrals made -none    Return if symptoms worsen or fail to improve.      Subjective       Patient ID:  Erick Baron is a 19 y.o. here today for follow up concussion evaluation    School/Work: [employer]  Sport: Soccer  Date of Injury: 3/23/2025  Follow up interval: 3 weeks, 1 day    Change in Symptoms:  Better  Explain: Patient overall feels better than last visit.  She states restarting her gabapentin 100 mg twice daily.  She states the numbness sensations of her upper extremities have been less frequent and less intense.  She states that she will only have intermittent right frontal pressure headaches with academics and activities at baseline.  She has been continuing her ballet class at school.  She states for the most part she has been keeping up with academics at her baseline performance.  She states that she did miss some assignments that she was doing a separate activity.     Her baseline headaches of occipital neck pain have still been present intermittently but this is at baseline.  She states she does not consistently need to wear sunglasses as much anymore.  She states even at baseline she had some light and noise sensitivity as well.  She states the vision therapy with her  seems to be helping with her vision and being able to focus.    Patient becomes tearful when feeling  that she feels that she is better but not back to baseline.  She is concerned about needing an MRI of her brain due to postconcussive syndrome.  Patient states she is aware of postconcussive syndrome as she also takes athletic training courses as well.  She states she does not have insurance outside of her school insurance to afford this if it were needed.     Back to School Status:  Back in school full-time  Current Physical Activity:   Light aerobics; she does note being in a ballet class but with accommodations and only notices issues when her neck is in a flexed position (causes dizziness/occipital headaches and was present prior to concussion)  Subsequent Injuries:  No  Do symptoms worsen with Physical Activity?  Yes  Do symptoms worsen with Cognitive Activity?  Yes  Overall Rating:  What percent is this person back to normal?  Patient 75 % (despite patient feeling better than last visit she does report feeling 75% today when previously stated she was at 80%)  Response to Meds:  N/A    The following portions of the patient's history were reviewed and updated as appropriate: allergies, current medications, past family history, past medical history, past social history, past surgical history, and problem list.        Physical Exam     There were no vitals filed for this visit.      General:   NAD:  Yes  Psych:   AAOX3:  Yes   Mood and Affect:  Normal  HEENT:   Lacerations:  No   Bruising:  No   PEERLA:  Yes   EOMI:  Yes   C/D/I:  Yes   Fracture/Trauma:  No   Fundi Discs Sharp:  N/A  Neuro:   Examination of Coordination:  Abnormal:   Limited Balance:   No, Past Pointing:   Normal, Single Leg Stance:   Abnormal.  Explain:  0 errors eyes open, 1 error eyes closed, Forward Tandem Gait:   Normal, Backward Tandem Gait:   Normal, Eyes Close Tandem Gait:   Normal, Dysdiadochokinesia:   Bilateral:   No, and Finger - Nose Impaired:   Bilateral:   No   CNII - XII Intact:  Yes   FTN:  Normal   Accommodation:  5cm  b/l   Convergence:  5cm  Vestibular Ocular:  Gaze stability:  Abnormal:   Dizziness with verticle motion

## 2025-04-16 ENCOUNTER — ATHLETIC TRAINING (OUTPATIENT)
Dept: SPORTS MEDICINE | Facility: OTHER | Age: 20
End: 2025-04-16

## 2025-04-16 DIAGNOSIS — M62.89 TIGHTNESS OF FASCIA: Primary | ICD-10-CM

## 2025-04-16 NOTE — PROGRESS NOTES
4/16/25: pt came in for appointment, started with heat, the soft tissue manipulation was completed

## 2025-04-22 ENCOUNTER — ATHLETIC TRAINING (OUTPATIENT)
Dept: SPORTS MEDICINE | Facility: OTHER | Age: 20
End: 2025-04-22

## 2025-04-22 DIAGNOSIS — M62.89 TIGHTNESS OF FASCIA: Primary | ICD-10-CM

## 2025-04-22 DIAGNOSIS — S06.0X0A CONCUSSION WITHOUT LOSS OF CONSCIOUSNESS, INITIAL ENCOUNTER: Primary | ICD-10-CM

## 2025-04-22 NOTE — PROGRESS NOTES
"4/22/25: pt symptom checklist today     Day 1 Bike times  4/14: 1:41  4/15: 2:32  4/16: 2:35  4/17: 2:33  4/22: 3:00    Symptom Evaluation     0 = No Symptoms; 1 or 2 = Mild Symptoms; 3 or 4 = Moderate Symptoms, 5 or 6 = Severe Symptoms       (Insert Columns as needed for subsequent dates)  Date: 4/22/2025   Symptom Symptom Score   Headache  4   Pressure in head  3   Neck Pain  2   Nausea or vomiting  1   Dizziness  2   Blurred Vision  2   Balance Problems  0   Sensitivity to light  1   Sensitivity to noise  2   Feeling slowed down  1   Feeling like \"in a fog\"  2   Don't feel right  1   Difficulty concentrating  1   Difficulty remembering  0   Fatigue or low energy 2    Confusion  1   Drowsiness  1   More emotional  3   Irritability  2   Sadness  0   Nervous or Anxious  1   Trouble falling asleep (if applicable)  3   Total number of Symptoms (of 22):     Symptom Severity Score (of 132):     Do your symptoms get worse with physical activity?     Do your symptoms get worse with mental activity?       88%  \"Headache with neck flexion, sharp pain same spots, nausea (w/ schoolwork reading & concentration)     "

## 2025-04-22 NOTE — PROGRESS NOTES
4/22/25: came in for appointment on neck/back and shoulders   Heat for 10 minutes   Thoracic stretches and mobility (child pose, cat/cow, thread the needle)  Occipital release and scalene manual therapy  Shoulder stretches (IR, ER, rhomboids)

## 2025-04-29 DIAGNOSIS — M79.2 RADICULAR PAIN OF LEFT UPPER EXTREMITY: ICD-10-CM

## 2025-04-29 DIAGNOSIS — M54.2 CHRONIC NECK PAIN: ICD-10-CM

## 2025-04-29 DIAGNOSIS — R20.0 NUMBNESS AND TINGLING OF BOTH UPPER EXTREMITIES: ICD-10-CM

## 2025-04-29 DIAGNOSIS — G89.29 CHRONIC NECK PAIN: ICD-10-CM

## 2025-04-29 DIAGNOSIS — R20.2 NUMBNESS AND TINGLING OF BOTH UPPER EXTREMITIES: ICD-10-CM

## 2025-04-29 DIAGNOSIS — M79.2 RADICULAR PAIN OF RIGHT UPPER EXTREMITY: ICD-10-CM

## 2025-04-29 RX ORDER — GABAPENTIN 100 MG/1
100 CAPSULE ORAL 3 TIMES DAILY
Qty: 270 CAPSULE | Refills: 1 | Status: SHIPPED | OUTPATIENT
Start: 2025-04-29

## 2025-04-30 ENCOUNTER — ATHLETIC TRAINING (OUTPATIENT)
Dept: SPORTS MEDICINE | Facility: OTHER | Age: 20
End: 2025-04-30

## 2025-04-30 DIAGNOSIS — M62.89 TIGHTNESS OF FASCIA: Primary | ICD-10-CM

## 2025-04-30 NOTE — PROGRESS NOTES
4/30/25: pt came in for open clinic for some right back pain  Started with heat   Did some knee rocks   Child pose  Thread the needle   Cat cows   I did my back stretch with them on the ground then reported pain with it   Did a QL stretch  Ended on trigger point

## 2025-05-01 DIAGNOSIS — S06.0X0D CONCUSSION WITHOUT LOSS OF CONSCIOUSNESS, SUBSEQUENT ENCOUNTER: ICD-10-CM

## 2025-05-01 DIAGNOSIS — R20.2 NUMBNESS AND TINGLING OF BOTH UPPER EXTREMITIES: ICD-10-CM

## 2025-05-01 DIAGNOSIS — G89.29 CHRONIC NECK PAIN: Primary | ICD-10-CM

## 2025-05-01 DIAGNOSIS — R20.0 NUMBNESS AND TINGLING OF BOTH UPPER EXTREMITIES: ICD-10-CM

## 2025-05-01 DIAGNOSIS — M54.2 CHRONIC NECK PAIN: Primary | ICD-10-CM

## 2025-05-06 ENCOUNTER — ATHLETIC TRAINING (OUTPATIENT)
Dept: SPORTS MEDICINE | Facility: OTHER | Age: 20
End: 2025-05-06

## 2025-05-06 DIAGNOSIS — S06.0X0D CONCUSSION WITHOUT LOSS OF CONSCIOUSNESS, SUBSEQUENT ENCOUNTER: Primary | ICD-10-CM

## 2025-05-06 DIAGNOSIS — F07.81 POST CONCUSSION SYNDROME: Primary | ICD-10-CM

## 2025-05-06 NOTE — PROGRESS NOTES
Patient/athletic training team seeing patient Omar received reach out to me noting that patient would like to proceed with obtaining an MRI of her brain without contrast as she continues have daily headaches from her head injury on 3/23/2025.  Suspected postconcussive syndrome but given the chronicity of this issue since her head injury on 3/23/2025 I have ordered an MRI of her brain without contrast going forward.

## 2025-05-07 ENCOUNTER — TELEPHONE (OUTPATIENT)
Dept: OBGYN CLINIC | Facility: CLINIC | Age: 20
End: 2025-05-07

## 2025-05-07 NOTE — PROGRESS NOTES
Athletic Training Progress Note    Name: Erick Baron  Age: 19 y.o.     Assessment/Plan:     Visit Diagnosis: Post concussion syndrome [F07.81]    Treatment Plan: Follow-up with Dr. Walker    []  Follow-up PRN.   []  Follow-up prior to next practice/game for re-evaluation.  [x]  Daily treatment/rehab. Progress note expected weekly.     Subjective: Pt states that they are still experiencing a variety of symptoms following her concussion. (Please see symptoms sheets). Light aerobic activity causing her symptoms to rise. These symptoms have been going to for weeks with little resolve. Erick is visibly frustrated and upset with progress seen.      Objective: Still showing signs and symptoms of a concussion that likely has progressed more into post concussion syndrome. Pt cannot do more than a few minutes on a bike without increased symptoms. All forward heads movement increase symptoms, especially cranial pressure.     TMJ pain also increases concussion like symptoms.    Treatment Log:  I will follow up with Dr. Walker today to discuss a future plan of care for Erick. Erick completed her VOMS exercises. I attempted to complete head and neck exercises with her, but we stopped due to increased symptoms.   Date: 5/6       Playing Status:                Exercise/Treatment        TMJ Release        Occipital release

## 2025-05-14 ENCOUNTER — HOSPITAL ENCOUNTER (OUTPATIENT)
Dept: MRI IMAGING | Facility: HOSPITAL | Age: 20
Discharge: HOME | End: 2025-05-14
Attending: STUDENT IN AN ORGANIZED HEALTH CARE EDUCATION/TRAINING PROGRAM
Payer: COMMERCIAL

## 2025-05-14 DIAGNOSIS — S06.0X0D CONCUSSION WITHOUT LOSS OF CONSCIOUSNESS, SUBSEQUENT ENCOUNTER: ICD-10-CM

## 2025-05-14 PROCEDURE — 70551 MRI BRAIN STEM W/O DYE: CPT

## 2025-05-19 ENCOUNTER — OFFICE VISIT (OUTPATIENT)
Age: 20
End: 2025-05-19
Payer: COMMERCIAL

## 2025-05-19 VITALS — BODY MASS INDEX: 21.66 KG/M2 | HEIGHT: 65 IN | WEIGHT: 130 LBS

## 2025-05-19 DIAGNOSIS — R09.81 SINUS CONGESTION: Primary | ICD-10-CM

## 2025-05-19 DIAGNOSIS — F07.81 POSTCONCUSSION SYNDROME: ICD-10-CM

## 2025-05-19 PROCEDURE — 99213 OFFICE O/P EST LOW 20 MIN: CPT | Performed by: STUDENT IN AN ORGANIZED HEALTH CARE EDUCATION/TRAINING PROGRAM

## 2025-05-19 NOTE — PROGRESS NOTES
Imaging Studies (I personally reviewed images in PACS and report):    MRI brain w/o contrast 5/14/2025:  FINDINGS:     BRAIN PARENCHYMA:  There is no discrete mass, mass effect or midline shift. There is no intracranial hemorrhage.  There is no evidence of acute infarction and diffusion imaging is unremarkable.  There are no white matter changes in the cerebral   hemispheres.     VENTRICLES:  Normal for the patient's age.     SELLA AND PITUITARY GLAND:  Normal.     ORBITS:  Normal.     PARANASAL SINUSES:  Mucosal thickening of the sinuses with no air fluid levels.     VASCULATURE:  Evaluation of the major intracranial vasculature demonstrates appropriate flow voids.     CALVARIUM AND SKULL BASE: Low T1 signal in the marrow suggestive of red marrow proliferation, otherwise unremarkable.     EXTRACRANIAL SOFT TISSUES:  Normal.     IMPRESSION:     Normal noncontrast MRI of the brain.      Assessment       Diagnoses and all orders for this visit:    Sinus congestion  -     Ambulatory Referral to Allergy; Future    Postconcussion syndrome  -     Ambulatory Referral to Allergy; Future        Other factors:  History of depression/mood related disorder - patient tearful on visits  History of headache/migraine d/o at baseline  History of chronic neck pain and reported numbness and tingling of left upper extremity. Had EMG done and MRI c-spine done that had been unremarkable.  Reduction in numbness sensations with gabapentin  Autism spectrum disorder    Plan  Clinical exam and radiographic imaging reviewed with patient/parent today, with impression as per above. I have discussed with the patient the pathophysiology of this diagnosis and reviewed how the examination correlates with this diagnosis.    MRI brain w/o contrast reviewed as above. Reassured patient of findings of no concerning structural findings of brain. It does mention sinus mucosal thickening and patient endorses consistent sinus congestion for most of the year.  Does not like to use nasal sprays such as flonase and inconsistently uses claritin; tends to use benadryl to control allergies. I counseled that this issue could be a significant source of her headaches if not fully controlled. Patient has not seen an allergist previously and was open to being referred - referral placed to allergist going forward.  In regards to sports, can progressively return to sports as tolerated going forward.  Counseled she could consider participating in noncontact sports to minimize risk of head injuries.  I counseled her to fully return back to sports she would need to be lifted from the academic accommodations though.      Return for Follow up with allergist to address sinus congestion.      Subjective       Patient ID:  Erick Baron is a 19 y.o. here today for follow up concussion evaluation    School/Work: [employer]  Sport: Soccer  Date of Injury: 3/23/2025  Follow up interval: 8 weeks, 1 day    Change in Symptoms:  no change  Explain: Reports continued headaches frontall and occipitally. Describes     Memory improving, light sensitivity seemed to improve until she started summer classes recently. Still feels issues with focusing with vision, dizziness. Has been working on vestibular treatments with AT team which she feels provides some relief.    Patient notes reading the MRI results and the mention of sinus congestion. She mentions that she was dealing with nosebleeds a few weeks ago which prompted her to want an MRI of her brain. In regards to sinus congestion, she states this is a consistent issues she deals with during most of the year. She reports a history of seasonal allergies as well as food allergies. Has not seen an allergist previously. She states she does not use        Back to School Status:  Back in school full-time  Current Physical Activity:  Light aerobics; )  Subsequent Injuries:  No  Do symptoms worsen with Physical Activity?  Yes  Do symptoms worsen with  Cognitive Activity?  Yes  Overall Rating:  What percent is this person back to normal?  Patient 80 %   Response to Meds:  N/A    The following portions of the patient's history were reviewed and updated as appropriate: allergies, current medications, past family history, past medical history, past social history, past surgical history, and problem list.        Physical Exam     There were no vitals filed for this visit.      General:   NAD:  Yes  Psych:   AAOX3:  Yes   Mood and Affect:  Normal  HEENT:   Lacerations:  No   Bruising:  No   PEERLA:  Yes   EOMI:  Yes   C/D/I:  Yes   Fracture/Trauma:  No   Fundi Discs Sharp:  N/A  Neuro:   Examination of Coordination:  Abnormal:   Limited Balance:   No, Past Pointing:   Normal, Single Leg Stance:   Abnormal.  Explain:  0 errors eyes open, 1 error eyes closed, Forward Tandem Gait:   Normal, Backward Tandem Gait:   Normal, Eyes Close Tandem Gait:   Normal, Dysdiadochokinesia:   Bilateral:   No, and Finger - Nose Impaired:   Bilateral:   No   CNII - XII Intact:  Yes   FTN:  Normal   Accommodation:  5cm b/l   Convergence:  5cm  Vestibular Ocular:  Gaze stability:  Abnormal:   Dizziness with verticle motion